# Patient Record
Sex: FEMALE | Race: BLACK OR AFRICAN AMERICAN | NOT HISPANIC OR LATINO | URBAN - METROPOLITAN AREA
[De-identification: names, ages, dates, MRNs, and addresses within clinical notes are randomized per-mention and may not be internally consistent; named-entity substitution may affect disease eponyms.]

---

## 2017-11-24 ENCOUNTER — INPATIENT (INPATIENT)
Facility: HOSPITAL | Age: 72
LOS: 0 days | Discharge: HOME | End: 2017-11-25
Attending: HOSPITALIST | Admitting: HOSPITALIST
Payer: MEDICARE

## 2017-11-24 VITALS
RESPIRATION RATE: 20 BRPM | TEMPERATURE: 100 F | DIASTOLIC BLOOD PRESSURE: 67 MMHG | HEART RATE: 76 BPM | SYSTOLIC BLOOD PRESSURE: 124 MMHG | OXYGEN SATURATION: 95 %

## 2017-11-24 DIAGNOSIS — Z90.710 ACQUIRED ABSENCE OF BOTH CERVIX AND UTERUS: Chronic | ICD-10-CM

## 2017-11-24 DIAGNOSIS — J18.9 PNEUMONIA, UNSPECIFIED ORGANISM: ICD-10-CM

## 2017-11-24 DIAGNOSIS — Z29.9 ENCOUNTER FOR PROPHYLACTIC MEASURES, UNSPECIFIED: ICD-10-CM

## 2017-11-24 DIAGNOSIS — Z90.49 ACQUIRED ABSENCE OF OTHER SPECIFIED PARTS OF DIGESTIVE TRACT: Chronic | ICD-10-CM

## 2017-11-24 DIAGNOSIS — I10 ESSENTIAL (PRIMARY) HYPERTENSION: ICD-10-CM

## 2017-11-24 DIAGNOSIS — N17.9 ACUTE KIDNEY FAILURE, UNSPECIFIED: ICD-10-CM

## 2017-11-24 LAB
ALBUMIN SERPL ELPH-MCNC: 3.4 G/DL — SIGNIFICANT CHANGE UP (ref 3.3–5)
ALP SERPL-CCNC: 78 U/L — SIGNIFICANT CHANGE UP (ref 40–120)
ALT FLD-CCNC: 59 U/L — HIGH (ref 4–33)
APPEARANCE UR: CLEAR — SIGNIFICANT CHANGE UP
AST SERPL-CCNC: 81 U/L — HIGH (ref 4–32)
B PERT DNA SPEC QL NAA+PROBE: SIGNIFICANT CHANGE UP
BACTERIA # UR AUTO: SIGNIFICANT CHANGE UP
BASE EXCESS BLDV CALC-SCNC: 4.9 MMOL/L — SIGNIFICANT CHANGE UP
BASOPHILS # BLD AUTO: 0.02 K/UL — SIGNIFICANT CHANGE UP (ref 0–0.2)
BASOPHILS NFR BLD AUTO: 0.2 % — SIGNIFICANT CHANGE UP (ref 0–2)
BILIRUB SERPL-MCNC: 0.6 MG/DL — SIGNIFICANT CHANGE UP (ref 0.2–1.2)
BILIRUB UR-MCNC: NEGATIVE — SIGNIFICANT CHANGE UP
BLOOD GAS VENOUS - CREATININE: 1.19 MG/DL — SIGNIFICANT CHANGE UP (ref 0.5–1.3)
BLOOD UR QL VISUAL: HIGH
BUN SERPL-MCNC: 15 MG/DL — SIGNIFICANT CHANGE UP (ref 7–23)
C PNEUM DNA SPEC QL NAA+PROBE: NOT DETECTED — SIGNIFICANT CHANGE UP
CALCIUM SERPL-MCNC: 8.8 MG/DL — SIGNIFICANT CHANGE UP (ref 8.4–10.5)
CHLORIDE BLDV-SCNC: 96 MMOL/L — SIGNIFICANT CHANGE UP (ref 96–108)
CHLORIDE SERPL-SCNC: 94 MMOL/L — LOW (ref 98–107)
CO2 SERPL-SCNC: 27 MMOL/L — SIGNIFICANT CHANGE UP (ref 22–31)
COLOR SPEC: YELLOW — SIGNIFICANT CHANGE UP
CREAT SERPL-MCNC: 1.27 MG/DL — SIGNIFICANT CHANGE UP (ref 0.5–1.3)
EOSINOPHIL # BLD AUTO: 0 K/UL — SIGNIFICANT CHANGE UP (ref 0–0.5)
EOSINOPHIL NFR BLD AUTO: 0 % — SIGNIFICANT CHANGE UP (ref 0–6)
FLUAV H1 2009 PAND RNA SPEC QL NAA+PROBE: NOT DETECTED — SIGNIFICANT CHANGE UP
FLUAV H1 RNA SPEC QL NAA+PROBE: NOT DETECTED — SIGNIFICANT CHANGE UP
FLUAV H3 RNA SPEC QL NAA+PROBE: NOT DETECTED — SIGNIFICANT CHANGE UP
FLUAV SUBTYP SPEC NAA+PROBE: SIGNIFICANT CHANGE UP
FLUBV RNA SPEC QL NAA+PROBE: NOT DETECTED — SIGNIFICANT CHANGE UP
GAS PNL BLDV: 133 MMOL/L — LOW (ref 136–146)
GLUCOSE BLDV-MCNC: 108 — HIGH (ref 70–99)
GLUCOSE SERPL-MCNC: 106 MG/DL — HIGH (ref 70–99)
GLUCOSE UR-MCNC: NEGATIVE — SIGNIFICANT CHANGE UP
HADV DNA SPEC QL NAA+PROBE: NOT DETECTED — SIGNIFICANT CHANGE UP
HCO3 BLDV-SCNC: 27 MMOL/L — SIGNIFICANT CHANGE UP (ref 20–27)
HCOV 229E RNA SPEC QL NAA+PROBE: NOT DETECTED — SIGNIFICANT CHANGE UP
HCOV HKU1 RNA SPEC QL NAA+PROBE: NOT DETECTED — SIGNIFICANT CHANGE UP
HCOV NL63 RNA SPEC QL NAA+PROBE: NOT DETECTED — SIGNIFICANT CHANGE UP
HCOV OC43 RNA SPEC QL NAA+PROBE: NOT DETECTED — SIGNIFICANT CHANGE UP
HCT VFR BLD CALC: 40.2 % — SIGNIFICANT CHANGE UP (ref 34.5–45)
HCT VFR BLDV CALC: 39.4 % — SIGNIFICANT CHANGE UP (ref 34.5–45)
HGB BLD-MCNC: 12.6 G/DL — SIGNIFICANT CHANGE UP (ref 11.5–15.5)
HGB BLDV-MCNC: 12.8 G/DL — SIGNIFICANT CHANGE UP (ref 11.5–15.5)
HMPV RNA SPEC QL NAA+PROBE: NOT DETECTED — SIGNIFICANT CHANGE UP
HPIV1 RNA SPEC QL NAA+PROBE: NOT DETECTED — SIGNIFICANT CHANGE UP
HPIV2 RNA SPEC QL NAA+PROBE: NOT DETECTED — SIGNIFICANT CHANGE UP
HPIV3 RNA SPEC QL NAA+PROBE: NOT DETECTED — SIGNIFICANT CHANGE UP
HPIV4 RNA SPEC QL NAA+PROBE: NOT DETECTED — SIGNIFICANT CHANGE UP
IMM GRANULOCYTES # BLD AUTO: 0.05 # — SIGNIFICANT CHANGE UP
IMM GRANULOCYTES NFR BLD AUTO: 0.5 % — SIGNIFICANT CHANGE UP (ref 0–1.5)
KETONES UR-MCNC: SIGNIFICANT CHANGE UP
LACTATE BLDV-MCNC: 1.9 MMOL/L — SIGNIFICANT CHANGE UP (ref 0.5–2)
LEUKOCYTE ESTERASE UR-ACNC: NEGATIVE — SIGNIFICANT CHANGE UP
LYMPHOCYTES # BLD AUTO: 1.48 K/UL — SIGNIFICANT CHANGE UP (ref 1–3.3)
LYMPHOCYTES # BLD AUTO: 15.2 % — SIGNIFICANT CHANGE UP (ref 13–44)
M PNEUMO DNA SPEC QL NAA+PROBE: NOT DETECTED — SIGNIFICANT CHANGE UP
MCHC RBC-ENTMCNC: 30.4 PG — SIGNIFICANT CHANGE UP (ref 27–34)
MCHC RBC-ENTMCNC: 31.3 % — LOW (ref 32–36)
MCV RBC AUTO: 96.9 FL — SIGNIFICANT CHANGE UP (ref 80–100)
MONOCYTES # BLD AUTO: 1.03 K/UL — HIGH (ref 0–0.9)
MONOCYTES NFR BLD AUTO: 10.6 % — SIGNIFICANT CHANGE UP (ref 2–14)
NEUTROPHILS # BLD AUTO: 7.15 K/UL — SIGNIFICANT CHANGE UP (ref 1.8–7.4)
NEUTROPHILS NFR BLD AUTO: 73.5 % — SIGNIFICANT CHANGE UP (ref 43–77)
NITRITE UR-MCNC: NEGATIVE — SIGNIFICANT CHANGE UP
NON-SQ EPI CELLS # UR AUTO: <1 — SIGNIFICANT CHANGE UP
NRBC # FLD: 0 — SIGNIFICANT CHANGE UP
PCO2 BLDV: 50 MMHG — SIGNIFICANT CHANGE UP (ref 41–51)
PH BLDV: 7.39 PH — SIGNIFICANT CHANGE UP (ref 7.32–7.43)
PH UR: 6 — SIGNIFICANT CHANGE UP (ref 4.6–8)
PLATELET # BLD AUTO: 164 K/UL — SIGNIFICANT CHANGE UP (ref 150–400)
PMV BLD: 13.2 FL — HIGH (ref 7–13)
PO2 BLDV: 28 MMHG — LOW (ref 35–40)
POTASSIUM BLDV-SCNC: 4.1 MMOL/L — SIGNIFICANT CHANGE UP (ref 3.4–4.5)
POTASSIUM SERPL-MCNC: 4.4 MMOL/L — SIGNIFICANT CHANGE UP (ref 3.5–5.3)
POTASSIUM SERPL-SCNC: 4.4 MMOL/L — SIGNIFICANT CHANGE UP (ref 3.5–5.3)
PROT SERPL-MCNC: 7.6 G/DL — SIGNIFICANT CHANGE UP (ref 6–8.3)
PROT UR-MCNC: 30 — HIGH
RBC # BLD: 4.15 M/UL — SIGNIFICANT CHANGE UP (ref 3.8–5.2)
RBC # FLD: 13.3 % — SIGNIFICANT CHANGE UP (ref 10.3–14.5)
RBC CASTS # UR COMP ASSIST: SIGNIFICANT CHANGE UP (ref 0–?)
RSV RNA SPEC QL NAA+PROBE: NOT DETECTED — SIGNIFICANT CHANGE UP
RV+EV RNA SPEC QL NAA+PROBE: NOT DETECTED — SIGNIFICANT CHANGE UP
SAO2 % BLDV: 48.4 % — LOW (ref 60–85)
SODIUM SERPL-SCNC: 135 MMOL/L — SIGNIFICANT CHANGE UP (ref 135–145)
SP GR SPEC: 1.01 — SIGNIFICANT CHANGE UP (ref 1–1.03)
SQUAMOUS # UR AUTO: SIGNIFICANT CHANGE UP
UROBILINOGEN FLD QL: NORMAL E.U. — SIGNIFICANT CHANGE UP (ref 0.1–0.2)
WBC # BLD: 9.73 K/UL — SIGNIFICANT CHANGE UP (ref 3.8–10.5)
WBC # FLD AUTO: 9.73 K/UL — SIGNIFICANT CHANGE UP (ref 3.8–10.5)
WBC UR QL: HIGH (ref 0–?)

## 2017-11-24 PROCEDURE — 71020: CPT | Mod: 26

## 2017-11-24 PROCEDURE — 99223 1ST HOSP IP/OBS HIGH 75: CPT | Mod: GC

## 2017-11-24 RX ORDER — ACETAMINOPHEN 500 MG
650 TABLET ORAL EVERY 6 HOURS
Qty: 0 | Refills: 0 | Status: DISCONTINUED | OUTPATIENT
Start: 2017-11-24 | End: 2017-11-25

## 2017-11-24 RX ORDER — SODIUM CHLORIDE 9 MG/ML
500 INJECTION INTRAMUSCULAR; INTRAVENOUS; SUBCUTANEOUS ONCE
Qty: 0 | Refills: 0 | Status: DISCONTINUED | OUTPATIENT
Start: 2017-11-24 | End: 2017-11-24

## 2017-11-24 RX ORDER — HEPARIN SODIUM 5000 [USP'U]/ML
5000 INJECTION INTRAVENOUS; SUBCUTANEOUS EVERY 8 HOURS
Qty: 0 | Refills: 0 | Status: DISCONTINUED | OUTPATIENT
Start: 2017-11-24 | End: 2017-11-25

## 2017-11-24 RX ORDER — ACETAMINOPHEN 500 MG
650 TABLET ORAL ONCE
Qty: 0 | Refills: 0 | Status: COMPLETED | OUTPATIENT
Start: 2017-11-24 | End: 2017-11-24

## 2017-11-24 RX ORDER — SODIUM CHLORIDE 9 MG/ML
1000 INJECTION INTRAMUSCULAR; INTRAVENOUS; SUBCUTANEOUS ONCE
Qty: 0 | Refills: 0 | Status: COMPLETED | OUTPATIENT
Start: 2017-11-24 | End: 2017-11-24

## 2017-11-24 RX ORDER — IPRATROPIUM/ALBUTEROL SULFATE 18-103MCG
3 AEROSOL WITH ADAPTER (GRAM) INHALATION ONCE
Qty: 0 | Refills: 0 | Status: COMPLETED | OUTPATIENT
Start: 2017-11-24 | End: 2017-11-24

## 2017-11-24 RX ORDER — HYDROCHLOROTHIAZIDE 25 MG
25 TABLET ORAL DAILY
Qty: 0 | Refills: 0 | Status: DISCONTINUED | OUTPATIENT
Start: 2017-11-24 | End: 2017-11-25

## 2017-11-24 RX ORDER — HYDROCHLOROTHIAZIDE 25 MG
25 TABLET ORAL DAILY
Qty: 0 | Refills: 0 | Status: DISCONTINUED | OUTPATIENT
Start: 2017-11-24 | End: 2017-11-24

## 2017-11-24 RX ORDER — ACETAMINOPHEN 500 MG
1000 TABLET ORAL ONCE
Qty: 0 | Refills: 0 | Status: COMPLETED | OUTPATIENT
Start: 2017-11-24 | End: 2017-11-24

## 2017-11-24 RX ORDER — AZITHROMYCIN 500 MG/1
500 TABLET, FILM COATED ORAL ONCE
Qty: 0 | Refills: 0 | Status: COMPLETED | OUTPATIENT
Start: 2017-11-24 | End: 2017-11-24

## 2017-11-24 RX ORDER — SODIUM CHLORIDE 9 MG/ML
1000 INJECTION INTRAMUSCULAR; INTRAVENOUS; SUBCUTANEOUS
Qty: 0 | Refills: 0 | Status: DISCONTINUED | OUTPATIENT
Start: 2017-11-24 | End: 2017-11-25

## 2017-11-24 RX ORDER — IPRATROPIUM/ALBUTEROL SULFATE 18-103MCG
3 AEROSOL WITH ADAPTER (GRAM) INHALATION EVERY 6 HOURS
Qty: 0 | Refills: 0 | Status: DISCONTINUED | OUTPATIENT
Start: 2017-11-24 | End: 2017-11-25

## 2017-11-24 RX ORDER — CEFTRIAXONE 500 MG/1
1 INJECTION, POWDER, FOR SOLUTION INTRAMUSCULAR; INTRAVENOUS ONCE
Qty: 0 | Refills: 0 | Status: COMPLETED | OUTPATIENT
Start: 2017-11-24 | End: 2017-11-24

## 2017-11-24 RX ADMIN — HEPARIN SODIUM 5000 UNIT(S): 5000 INJECTION INTRAVENOUS; SUBCUTANEOUS at 13:43

## 2017-11-24 RX ADMIN — SODIUM CHLORIDE 80 MILLILITER(S): 9 INJECTION INTRAMUSCULAR; INTRAVENOUS; SUBCUTANEOUS at 13:43

## 2017-11-24 RX ADMIN — Medication 3 MILLILITER(S): at 10:24

## 2017-11-24 RX ADMIN — CEFTRIAXONE 100 GRAM(S): 500 INJECTION, POWDER, FOR SOLUTION INTRAMUSCULAR; INTRAVENOUS at 10:18

## 2017-11-24 RX ADMIN — Medication 650 MILLIGRAM(S): at 23:03

## 2017-11-24 RX ADMIN — AZITHROMYCIN 250 MILLIGRAM(S): 500 TABLET, FILM COATED ORAL at 11:04

## 2017-11-24 RX ADMIN — Medication 3 MILLILITER(S): at 09:53

## 2017-11-24 RX ADMIN — Medication 3 MILLILITER(S): at 22:55

## 2017-11-24 RX ADMIN — Medication 100 MILLIGRAM(S): at 23:02

## 2017-11-24 RX ADMIN — SODIUM CHLORIDE 1000 MILLILITER(S): 9 INJECTION INTRAMUSCULAR; INTRAVENOUS; SUBCUTANEOUS at 10:14

## 2017-11-24 RX ADMIN — Medication 400 MILLIGRAM(S): at 10:08

## 2017-11-24 NOTE — H&P ADULT - NSHPLABSRESULTS_GEN_ALL_CORE
(11-24 @ 10:12)                      12.6  9.73 )-----------( 164                 40.2    Neutrophils = 7.15 (73.5%)  Lymphocytes = 1.48 (15.2%)  Eosinophils = 0.00 (0.0%)  Basophils = 0.02 (0.2%)  Monocytes = 1.03 (10.6%)  Bands = --%    11-24    135  |  94<L>  |  15  ----------------------------<  106<H>  4.4   |  27  |  1.27    Ca    8.8      24 Nov 2017 10:12    TPro  7.6  /  Alb  3.4  /  TBili  0.6  /  DBili  x   /  AST  81<H>  /  ALT  59<H>  /  AlkPhos  78  11-24          RVP: negative     Venous Blood Gas:  11-24 @ 10:12  7.39/50/28/27/48.4  VBG Lactate: 1.9      Chest X-ray: RLL consolidation.

## 2017-11-24 NOTE — ED PROVIDER NOTE - MEDICAL DECISION MAKING DETAILS
Pt is a 73 y/o F nonsmoker PMHx HTN, h/o hysterectomy p/w chills, cough x 1 week. -- concerning for pneumonia, bronchitis, sepsis -- labs, ua, ucx, cxr, vbg, iv fluids, antibiotics

## 2017-11-24 NOTE — ED PROVIDER NOTE - NONTENDER LOCATION
left upper quadrant/left lower quadrant/umbilical/left costovertebral angle/right lower quadrant/right costovertebral angle/right upper quadrant/periumbilical/suprapubic

## 2017-11-24 NOTE — H&P ADULT - PROBLEM SELECTOR PLAN 1
-CXR notable for RLL PNA; s/p 1 dose of CTX & azithromycin   -will continue empiric coverage Levaquin 750 mg x 4 days  - f/u sputum cultures and urine legionella  - albuterol PRN for wheezing.   - tyelnol PRN for fevers  - Does not meet sepsis criteria but will f/u blood Cx. -CXR notable for RLL PNA; s/p 1 dose of CTX & azithromycin   -will continue empiric coverage Levaquin 750 mg x 4 days  - RVP negative; f/u sputum cultures and urine legionella  - albuterol PRN for wheezing.   - tyelnol PRN for fevers  - Does not meet sepsis criteria but will f/u blood Cx. -CXR notable for RML PNA; s/p 1 dose of CTX & azithromycin   -will continue empiric coverage Levaquin 750 mg x 4 days  - RVP negative; f/u sputum cultures and urine legionella  - albuterol PRN for wheezing.   - tyelnol PRN for fevers  - Does not meet sepsis criteria but will f/u blood Cx.

## 2017-11-24 NOTE — H&P ADULT - ATTENDING COMMENTS
Patient seen and examined, chart/lab reviewed, agree with above.    73 y/o female with h/o HTN, p/w cough/dyspnea, found to have RLL/RML pneumonia. Recent travel to [place] 2 wks ago, denies sick contact, diarrhea, chest pain.  PE: lung right base crackle, heart regular, abdomen soft, nt/nd, extrem: no edema    Assessment/plan:  # Community acquired pneumonia: iv levaquin, duoneb prn for wheezes, RVP negative, if improving can be discharged soon  check urine legionella and sputum cx   # HTN: controlled, cont hctz Patient seen and examined, chart/lab reviewed, agree with above.    73 y/o female with h/o HTN, p/w cough/dyspnea, found to have RLL/RML pneumonia. Recent travel to [place] 2 wks ago, denies sick contact, diarrhea, chest pain.  PE: lung right base crackle, heart regular, abdomen soft, nt/nd, extrem: no edema    Assessment/plan:  # Community acquired pneumonia: iv levaquin, duoneb prn for wheezes, RVP negative, if improving can be discharged soon  check urine legionella and sputum cx   # Mild transaminitis (AST/ALT 81/59), hyponatremia (Na 133 on VBG, 135 on BMP): IVF NS, r/o legionella which can cause hyponatremia/transaminitis  # HTN: controlled, cont hctz Patient seen and examined, chart/lab reviewed, agree with above.    71 y/o female with h/o HTN, p/w cough/dyspnea, found to have RML pneumonia. Recent travel to [place] 2 wks ago, denies sick contact, diarrhea, chest pain.  PE: lung right mid lobe crackle, heart regular, abdomen soft, nt/nd, extrem: no edema    Assessment/plan:  # Community acquired pneumonia: RML infiltrate on CXR,  iv levaquin, duoneb prn for wheezes, RVP negative, if improving can be discharged soon  check urine legionella and sputum cx   # Mild transaminitis (AST/ALT 81/59), hyponatremia (Na 133 on VBG, 135 on BMP): IVF NS, r/o legionella which can cause hyponatremia/transaminitis  # HTN: controlled, cont hctz

## 2017-11-24 NOTE — H&P ADULT - NSHPPHYSICALEXAM_GEN_ALL_CORE
Vital Signs Last 24 Hrs  T(C): 36.8 (24 Nov 2017 12:24), Max: 39.7 (24 Nov 2017 09:53)  T(F): 98.3 (24 Nov 2017 12:24), Max: 103.4 (24 Nov 2017 09:53)  HR: 78 (24 Nov 2017 12:24) (76 - 78)  BP: 133/62 (24 Nov 2017 12:24) (124/67 - 133/62)  BP(mean): --  RR: 17 (24 Nov 2017 12:24) (17 - 20)  SpO2: 95% (24 Nov 2017 12:24) (95% - 95%)    GENERAL: elderly female in no acute respiratory distress.   HEAD:  Atraumatic, Normocephalic  EYES: EOMI, PERRLA.   NECK: Supple, No JVD  CHEST/LUNG: diminished breath sounds (+)rales right base   HEART: Regular rate and rhythm; No murmurs, rubs, or gallops  ABDOMEN: Soft, Nontender, Nondistended; Bowel sounds present  EXTREMITIES:  2+ Peripheral Pulses, trace pedal edema b/l   PSYCH: AAOx3  NEUROLOGY: non-focal  SKIN: No rashes or lesions Vital Signs Last 24 Hrs  T(C): 36.8 (24 Nov 2017 12:24), Max: 39.7 (24 Nov 2017 09:53)  T(F): 98.3 (24 Nov 2017 12:24), Max: 103.4 (24 Nov 2017 09:53)  HR: 78 (24 Nov 2017 12:24) (76 - 78)  BP: 133/62 (24 Nov 2017 12:24) (124/67 - 133/62)  BP(mean): --  RR: 17 (24 Nov 2017 12:24) (17 - 20)  SpO2: 95% (24 Nov 2017 12:24) (95% - 95%)    GENERAL: elderly female in no acute respiratory distress.   HEAD:  Atraumatic, Normocephalic  EYES: EOMI, PERRLA.   NECK: Supple, No JVD  CHEST/LUNG: diminished breath sounds bibasilar;  (+)rales right base   HEART: Regular rate and rhythm; No murmurs, rubs, or gallops  ABDOMEN: Soft, Nontender, Nondistended; Bowel sounds present  EXTREMITIES:  2+ Peripheral Pulses, trace pedal edema b/l   PSYCH: AAOx3  NEUROLOGY: non-focal  SKIN: No rashes or lesions

## 2017-11-24 NOTE — ED PROVIDER NOTE - OBJECTIVE STATEMENT
Pt is a 73 y/o F nonsmoker PMHx HTN, h/o hysterectomy p/w chills, cough x 1 week.  Pt states she has had gradual onset chills associated with persistent nonproductive cough.  Pt notes for past few days she has developed wheezing and shortness of breath.  Pt notes nausea.  Denies any vomiting, sore throat, difficulty swallowing, chest pain, palpitations, headache, neck pain, abdominal pain, dysuria, cloudy urine, rash, sick contacts, recent hospitalizations.

## 2017-11-24 NOTE — ED ADULT NURSE NOTE - OBJECTIVE STATEMENT
is a 73 y/o female with c/o cough, SOB and fever. pt is AAOx4 ambulatory but has been feeling weak since she has had the cough and SOB. pt states sh has no appetite and it has been difficult for her to eat.

## 2017-11-24 NOTE — H&P ADULT - PROBLEM SELECTOR PLAN 3
-Patient reports decreased PO intake; unknown baseline Cr  -Light IVF hydration; trend Cr, avoid nephrotoxic agents.

## 2017-11-24 NOTE — ED PROVIDER NOTE - ATTENDING CONTRIBUTION TO CARE
I agree with the above H&P.  Briefly this is a 72 year old female with pmh htn presenting with cough fever and loss of appetitie for 1 week.  recently returned from [place].  concern for cap.  will get cxr fluids abx for cap and atypicals lactate.  likely admit.  less likely ape, acs, gerd.      CON : NAD  EENT : EOMI, MMM  NECK : Full ROM  RESP : crackles on right fields no increased WOB  CARD : rrr no m/r/g  ABD : S NT ND NABS no rebound  EXT : No edema  NEURO : AAOX3

## 2017-11-24 NOTE — H&P ADULT - HISTORY OF PRESENT ILLNESS
71 y/o F hx of HTN presents with shortness of breath, fever and non productive cough. Patient reports that cough started 9 days ago shortly after returning from [place]. Over the last week she has felt warm and developed shortness of breath and wheezing today that prompted her to seek medical care. She has no known sick contacts, recent hospitalizations or antibiotic use. She denies any hx of asthma or use of inhalers at home, denies ever suffering from pneumonia in past. She denies any HA, rhinorrhea, sore throat, chest pain, dysuria, diarrhea, or hx of DVT/PE.    In ED, patient given patient febrile to 103.4, saturating 95% w/RR of 20. She was given Duonebs, a dose of azithromycin and ceftriaxone with improvement. 73 y/o F hx of HTN presents with shortness of breath, fever and non productive cough. Patient reports that cough started 9 days ago shortly after returning from [place]. Over the last week she has felt warm and developed shortness of breath and wheezing today that prompted her to seek medical care. She has no known sick contacts, recent hospitalizations or antibiotic use. She denies any hx of asthma or use of inhalers at home, denies ever suffering from pneumonia in past. Describes generalized malaise and loss of appetite, but denies any HA, rhinorrhea, sore throat, chest pain, dysuria, diarrhea, or hx of DVT/PE.    In ED, patient given patient febrile to 103.4, saturating 95% w/RR of 20. She was given Duonebs, a dose of azithromycin and ceftriaxone with improvement. ·	71 y/o F hx of HTN presents with shortness of breath, fever and non productive cough. Patient reports that cough started 9 days ago shortly after returning from [place]. Over the last week she has felt warm and developed shortness of breath and wheezing today that prompted her to seek medical care. She has no known sick contacts, recent hospitalizations or antibiotic use. She denies any hx of asthma or use of inhalers at home, denies ever suffering from pneumonia in past. Describes generalized malaise and loss of appetite, but denies any HA, rhinorrhea, sore throat, chest pain, dysuria, diarrhea, or hx of DVT/PE.    In ED, patient given patient febrile to 103.4, saturating 95% w/RR of 20. She was given Duonebs, a dose of azithromycin and ceftriaxone with improvement.

## 2017-11-24 NOTE — H&P ADULT - NSHPSOCIALHISTORY_GEN_ALL_CORE
Patient lives at home with . Recent travel to [place] two weeks ago. Denies any tobacco use past or present; social EtOH consumption.

## 2017-11-24 NOTE — H&P ADULT - ASSESSMENT
71 y/o F hx of HTN admitted for community acquired pneumonia PSI 2. Little concern for MDR organisms given no recent hospitalizations or abx use; however, given recent travel will cover for atypical organisms. Does not meet criteria for sepsis.

## 2017-11-24 NOTE — H&P ADULT - NSHPREVIEWOFSYSTEMS_GEN_ALL_CORE
REVIEW OF SYSTEMS    General:	(+)fever, (+)LOLIS, (+)weakness, no night sweats.     Skin/Breast: no rash   	  Ophthalmologic: no vision changes   	  ENMT: no sore throat, no rhinorrhea    Respiratory and Thorax: (+)cough, (+)wheezing (+)dyspnea   	  Cardiovascular: no chest pain, no palpitations, no syncope, no orthopnea 	    Gastrointestinal: no nausea, no vomiting, no abdominal pain, no diarrhea,     Genitourinary: no dysuria, no frequency	    Musculoskeletal: no myalgias     Neurological: no dizziness, no HA		    Hematology/Lymphatics: no edema     Endocrine: no polyuria REVIEW OF SYSTEMS    General:	(+)fever, (+)LOLIS, (+)malaise, no night sweats.     Skin/Breast: no rash   	  Ophthalmologic: no vision changes   	  ENMT: no sore throat, no rhinorrhea    Respiratory and Thorax: (+)cough, (+)wheezing (+)dyspnea   	  Cardiovascular: no chest pain, no palpitations, no syncope, no orthopnea 	    Gastrointestinal: no nausea, no vomiting, no abdominal pain, no diarrhea,     Genitourinary: no dysuria, no frequency	    Musculoskeletal: no myalgias     Neurological: no dizziness, no HA		    Hematology/Lymphatics: no edema     Endocrine: no polyuria REVIEW OF SYSTEMS    General:	(+)fever, (+)LOLIS, (+)malaise, no night sweats.   Skin/Breast: no rash   Ophthalmologic: no vision changes   ENMT: no sore throat, no rhinorrhea  Respiratory and Thorax: (+)cough, (+)wheezing (+)dyspnea   Cardiovascular: no chest pain, no palpitations, no syncope, no orthopnea 	  Gastrointestinal: no nausea, no vomiting, no abdominal pain, no diarrhea,   Genitourinary: no dysuria, no frequency	  Musculoskeletal: no myalgias   Neurological: no dizziness, no HA		  Hematology/Lymphatics: no edema   Endocrine: no polyuria

## 2017-11-24 NOTE — H&P ADULT - PROBLEM SELECTOR PLAN 2
-at home on HCTZ-triamterene; unknown baseline kidney function  -continue HCTZ 25 mg qd with hold parameters; hold triamterene

## 2017-11-25 ENCOUNTER — TRANSCRIPTION ENCOUNTER (OUTPATIENT)
Age: 72
End: 2017-11-25

## 2017-11-25 VITALS
DIASTOLIC BLOOD PRESSURE: 73 MMHG | HEART RATE: 60 BPM | SYSTOLIC BLOOD PRESSURE: 146 MMHG | RESPIRATION RATE: 18 BRPM | TEMPERATURE: 99 F | OXYGEN SATURATION: 96 %

## 2017-11-25 LAB
ALBUMIN SERPL ELPH-MCNC: 2.7 G/DL — LOW (ref 3.3–5)
ALP SERPL-CCNC: 65 U/L — SIGNIFICANT CHANGE UP (ref 40–120)
ALT FLD-CCNC: 49 U/L — HIGH (ref 4–33)
AST SERPL-CCNC: 61 U/L — HIGH (ref 4–32)
BASOPHILS # BLD AUTO: 0.02 K/UL — SIGNIFICANT CHANGE UP (ref 0–0.2)
BASOPHILS NFR BLD AUTO: 0.3 % — SIGNIFICANT CHANGE UP (ref 0–2)
BILIRUB SERPL-MCNC: 0.3 MG/DL — SIGNIFICANT CHANGE UP (ref 0.2–1.2)
BUN SERPL-MCNC: 13 MG/DL — SIGNIFICANT CHANGE UP (ref 7–23)
CALCIUM SERPL-MCNC: 8.4 MG/DL — SIGNIFICANT CHANGE UP (ref 8.4–10.5)
CHLORIDE SERPL-SCNC: 105 MMOL/L — SIGNIFICANT CHANGE UP (ref 98–107)
CO2 SERPL-SCNC: 24 MMOL/L — SIGNIFICANT CHANGE UP (ref 22–31)
CREAT SERPL-MCNC: 0.9 MG/DL — SIGNIFICANT CHANGE UP (ref 0.5–1.3)
EOSINOPHIL # BLD AUTO: 0.01 K/UL — SIGNIFICANT CHANGE UP (ref 0–0.5)
EOSINOPHIL NFR BLD AUTO: 0.2 % — SIGNIFICANT CHANGE UP (ref 0–6)
GLUCOSE SERPL-MCNC: 88 MG/DL — SIGNIFICANT CHANGE UP (ref 70–99)
HCT VFR BLD CALC: 37.1 % — SIGNIFICANT CHANGE UP (ref 34.5–45)
HGB BLD-MCNC: 11.6 G/DL — SIGNIFICANT CHANGE UP (ref 11.5–15.5)
IMM GRANULOCYTES # BLD AUTO: 0.04 # — SIGNIFICANT CHANGE UP
IMM GRANULOCYTES NFR BLD AUTO: 0.7 % — SIGNIFICANT CHANGE UP (ref 0–1.5)
L PNEUMO AG UR QL: NEGATIVE — SIGNIFICANT CHANGE UP
LYMPHOCYTES # BLD AUTO: 1.54 K/UL — SIGNIFICANT CHANGE UP (ref 1–3.3)
LYMPHOCYTES # BLD AUTO: 25.1 % — SIGNIFICANT CHANGE UP (ref 13–44)
MAGNESIUM SERPL-MCNC: 2.4 MG/DL — SIGNIFICANT CHANGE UP (ref 1.6–2.6)
MCHC RBC-ENTMCNC: 30.4 PG — SIGNIFICANT CHANGE UP (ref 27–34)
MCHC RBC-ENTMCNC: 31.3 % — LOW (ref 32–36)
MCV RBC AUTO: 97.4 FL — SIGNIFICANT CHANGE UP (ref 80–100)
MONOCYTES # BLD AUTO: 0.93 K/UL — HIGH (ref 0–0.9)
MONOCYTES NFR BLD AUTO: 15.1 % — HIGH (ref 2–14)
NEUTROPHILS # BLD AUTO: 3.6 K/UL — SIGNIFICANT CHANGE UP (ref 1.8–7.4)
NEUTROPHILS NFR BLD AUTO: 58.6 % — SIGNIFICANT CHANGE UP (ref 43–77)
NRBC # FLD: 0 — SIGNIFICANT CHANGE UP
PHOSPHATE SERPL-MCNC: 2.9 MG/DL — SIGNIFICANT CHANGE UP (ref 2.5–4.5)
PLATELET # BLD AUTO: 171 K/UL — SIGNIFICANT CHANGE UP (ref 150–400)
PMV BLD: 12.6 FL — SIGNIFICANT CHANGE UP (ref 7–13)
POTASSIUM SERPL-MCNC: 4.2 MMOL/L — SIGNIFICANT CHANGE UP (ref 3.5–5.3)
POTASSIUM SERPL-SCNC: 4.2 MMOL/L — SIGNIFICANT CHANGE UP (ref 3.5–5.3)
PROT SERPL-MCNC: 6.4 G/DL — SIGNIFICANT CHANGE UP (ref 6–8.3)
RBC # BLD: 3.81 M/UL — SIGNIFICANT CHANGE UP (ref 3.8–5.2)
RBC # FLD: 13.4 % — SIGNIFICANT CHANGE UP (ref 10.3–14.5)
SODIUM SERPL-SCNC: 141 MMOL/L — SIGNIFICANT CHANGE UP (ref 135–145)
SPECIMEN SOURCE: SIGNIFICANT CHANGE UP
SPECIMEN SOURCE: SIGNIFICANT CHANGE UP
WBC # BLD: 6.14 K/UL — SIGNIFICANT CHANGE UP (ref 3.8–10.5)
WBC # FLD AUTO: 6.14 K/UL — SIGNIFICANT CHANGE UP (ref 3.8–10.5)

## 2017-11-25 PROCEDURE — 99239 HOSP IP/OBS DSCHRG MGMT >30: CPT

## 2017-11-25 RX ADMIN — Medication 25 MILLIGRAM(S): at 05:39

## 2017-11-25 RX ADMIN — HEPARIN SODIUM 5000 UNIT(S): 5000 INJECTION INTRAVENOUS; SUBCUTANEOUS at 05:33

## 2017-11-25 RX ADMIN — Medication 100 MILLIGRAM(S): at 09:50

## 2017-11-25 RX ADMIN — Medication 650 MILLIGRAM(S): at 00:03

## 2017-11-25 NOTE — DISCHARGE NOTE ADULT - PLAN OF CARE
Finish antibiotic treatment You were treated for pneumonia during your hospital stay. Finish your course of Levofloxacin for 5 more days and follow up with your primary care doctor in 1-2 weeks. BP <150/90 Continue your blood pressure medication at home. Avoid high salt foods, exercise as tolerated and get your pressures checked regularly with your primary care doctor.

## 2017-11-25 NOTE — PROGRESS NOTE ADULT - SUBJECTIVE AND OBJECTIVE BOX
Patient is a 72y old  Female who presents with a chief complaint of Coughing x1 week, poor appetite and weakness. (2017 14:29)      SUBJECTIVE / OVERNIGHT EVENTS:    MEDICATIONS  (STANDING):  heparin  Injectable 5000 Unit(s) SubCutaneous every 8 hours  hydrochlorothiazide 25 milliGRAM(s) Oral daily  levoFLOXacin IVPB 750 milliGRAM(s) IV Intermittent every 24 hours  sodium chloride 0.9%. 1000 milliLiter(s) (80 mL/Hr) IV Continuous <Continuous>    MEDICATIONS  (PRN):  acetaminophen   Tablet 650 milliGRAM(s) Oral every 6 hours PRN For Temp greater than 38 C (100.4 F)  ALBUTerol/ipratropium for Nebulization 3 milliLiter(s) Nebulizer every 6 hours PRN Shortness of Breath and/or Wheezing  benzonatate 100 milliGRAM(s) Oral every 8 hours PRN Cough        CAPILLARY BLOOD GLUCOSE        I&O's Summary    2017 07:01  -  2017 06:52  --------------------------------------------------------  IN: 1000 mL / OUT: 0 mL / NET: 1000 mL    Vital Signs Last 24 Hrs  T(C): 36.9 (2017 05:37), Max: 39.7 (2017 09:53)  T(F): 98.5 (2017 05:37), Max: 103.4 (2017 09:53)  HR: 58 (2017 05:37) (58 - 79)  BP: 137/51 (2017 05:37) (122/48 - 137/51)  BP(mean): --  RR: 18 (2017 05:37) (17 - 20)  SpO2: 99% (2017 05:37) (95% - 99%)    PHYSICAL EXAM:  GENERAL: NAD, well-developed  HEAD:  Atraumatic, Normocephalic  EYES: EOMI, PERRLA, conjunctiva and sclera clear  NECK: Supple, No JVD  CHEST/LUNG: Clear to auscultation bilaterally; No wheeze  HEART: Regular rate and rhythm; No murmurs, rubs, or gallops  ABDOMEN: Soft, Nontender, Nondistended; Bowel sounds present  EXTREMITIES:  2+ Peripheral Pulses, No clubbing, cyanosis, or edema  PSYCH: AAOx3  NEUROLOGY: non-focal  SKIN: No rashes or lesions    LABS:  ( @ 10:12)                        12.6  9.73 )-----------( 164                 40.2    Neutrophils = 7.15 (73.5%)  Lymphocytes = 1.48 (15.2%)  Eosinophils = 0.00 (0.0%)  Basophils = 0.02 (0.2%)  Monocytes = 1.03 (10.6%)  Bands = --%    WBC Trend: 9.73<--  Hb Trend: 12.6<--  Plt Trend: 164<--      135  |  94<L>  |  15  ----------------------------<  106<H>  4.4   |  27  |  1.27    Ca    8.8      2017 10:12    TPro  7.6  /  Alb  3.4  /  TBili  0.6  /  DBili  x   /  AST  81<H>  /  ALT  59<H>  /  AlkPhos  78      Creatinine Trend: 1.27<--        Urinalysis Basic - ( 2017 13:35 )    Color: YELLOW / Appearance: CLEAR / S.011 / pH: 6.0  Gluc: NEGATIVE / Ketone: TRACE  / Bili: NEGATIVE / Urobili: NORMAL E.U.   Blood: TRACE / Protein: 30 / Nitrite: NEGATIVE   Leuk Esterase: NEGATIVE / RBC: 0-2 / WBC 5-10   Sq Epi: OCC / Non Sq Epi: x / Bacteria: FEW        Microbiology:  Urine Cx:  Blood Cx:    RADIOLOGY & ADDITIONAL TESTS:  X- Ray:  CT:  Ultrasound:  [ ] imaging personally reviewed and interpreted by me    Consultant(s) Notes Reviewed:      Care Discussed with Consultants/Other Providers: Patient is a 72y old  Female who presents with a chief complaint of Coughing x1 week, poor appetite and weakness. (2017 14:29)      SUBJECTIVE / OVERNIGHT EVENTS: Overnight no acute issues. Patient still has cough that is alleviated with Tessalon pearls, but then returns.     MEDICATIONS  (STANDING):  heparin  Injectable 5000 Unit(s) SubCutaneous every 8 hours  hydrochlorothiazide 25 milliGRAM(s) Oral daily  levoFLOXacin IVPB 750 milliGRAM(s) IV Intermittent every 24 hours  sodium chloride 0.9%. 1000 milliLiter(s) (80 mL/Hr) IV Continuous <Continuous>    MEDICATIONS  (PRN):  acetaminophen   Tablet 650 milliGRAM(s) Oral every 6 hours PRN For Temp greater than 38 C (100.4 F)  ALBUTerol/ipratropium for Nebulization 3 milliLiter(s) Nebulizer every 6 hours PRN Shortness of Breath and/or Wheezing  benzonatate 100 milliGRAM(s) Oral every 8 hours PRN Cough        CAPILLARY BLOOD GLUCOSE        I&O's Summary    2017 07:01  -  2017 06:52  --------------------------------------------------------  IN: 1000 mL / OUT: 0 mL / NET: 1000 mL    Vital Signs Last 24 Hrs  T(C): 36.9 (2017 05:37), Max: 39.7 (2017 09:53)  T(F): 98.5 (2017 05:37), Max: 103.4 (2017 09:53)  HR: 58 (2017 05:37) (58 - 79)  BP: 137/51 (2017 05:37) (122/48 - 137/51)  BP(mean): --  RR: 18 (2017 05:37) (17 - 20)  SpO2: 99% (2017 05:37) (95% - 99%)    PHYSICAL EXAM:  GENERAL: NAD, well-developed  HEAD:  Atraumatic, Normocephalic  EYES: EOMI, PERRLA, conjunctiva and sclera clear  NECK: Supple, No JVD  CHEST/LUNG: (+)Rales R base  HEART: Regular rate and rhythm; No murmurs, rubs, or gallops  ABDOMEN: Soft, Nontender, Nondistended; Bowel sounds present  EXTREMITIES:  2+ Peripheral Pulses, No clubbing, cyanosis, or edema  PSYCH: AAOx3  NEUROLOGY: non-focal  SKIN: No rashes or lesions    LABS:  ( @ 10:12)                        12.6  9.73 )-----------( 164                 40.2    Neutrophils = 7.15 (73.5%)  Lymphocytes = 1.48 (15.2%)  Eosinophils = 0.00 (0.0%)  Basophils = 0.02 (0.2%)  Monocytes = 1.03 (10.6%)  Bands = --%    WBC Trend: 9.73<--  Hb Trend: 12.6<--  Plt Trend: 164<--      135  |  94<L>  |  15  ----------------------------<  106<H>  4.4   |  27  |  1.27    Ca    8.8      2017 10:12    TPro  7.6  /  Alb  3.4  /  TBili  0.6  /  DBili  x   /  AST  81<H>  /  ALT  59<H>  /  AlkPhos  78      Creatinine Trend: 1.27<--        Urinalysis Basic - ( 2017 13:35 )    Color: YELLOW / Appearance: CLEAR / S.011 / pH: 6.0  Gluc: NEGATIVE / Ketone: TRACE  / Bili: NEGATIVE / Urobili: NORMAL E.U.   Blood: TRACE / Protein: 30 / Nitrite: NEGATIVE   Leuk Esterase: NEGATIVE / RBC: 0-2 / WBC 5-10   Sq Epi: OCC / Non Sq Epi: x / Bacteria: FEW        Microbiology:  Urine Legionella Negative.     RADIOLOGY & ADDITIONAL TESTS:  X- Ray: RML PNA Patient is a 72y old  Female who presents with a chief complaint of Coughing x1 week, poor appetite and weakness. (2017 14:29)      SUBJECTIVE / OVERNIGHT EVENTS: Overnight no acute issues. Patient still has cough that is alleviated with Tessalon pearls, but then returns.     MEDICATIONS  (STANDING):  heparin  Injectable 5000 Unit(s) SubCutaneous every 8 hours  hydrochlorothiazide 25 milliGRAM(s) Oral daily  levoFLOXacin IVPB 750 milliGRAM(s) IV Intermittent every 24 hours  sodium chloride 0.9%. 1000 milliLiter(s) (80 mL/Hr) IV Continuous <Continuous>    MEDICATIONS  (PRN):  acetaminophen   Tablet 650 milliGRAM(s) Oral every 6 hours PRN For Temp greater than 38 C (100.4 F)  ALBUTerol/ipratropium for Nebulization 3 milliLiter(s) Nebulizer every 6 hours PRN Shortness of Breath and/or Wheezing  benzonatate 100 milliGRAM(s) Oral every 8 hours PRN Cough        CAPILLARY BLOOD GLUCOSE        I&O's Summary    2017 07:01  -  2017 06:52  --------------------------------------------------------  IN: 1000 mL / OUT: 0 mL / NET: 1000 mL    Vital Signs Last 24 Hrs  T(C): 36.9 (2017 05:37), Max: 39.7 (2017 09:53)  T(F): 98.5 (2017 05:37), Max: 103.4 (2017 09:53)  HR: 58 (2017 05:37) (58 - 79)  BP: 137/51 (2017 05:37) (122/48 - 137/51)  BP(mean): --  RR: 18 (2017 05:37) (17 - 20)  SpO2: 99% (2017 05:37) (95% - 99%)    PHYSICAL EXAM:  GENERAL: NAD, well-developed  HEAD:  Atraumatic, Normocephalic  EYES: EOMI, PERRLA, conjunctiva and sclera clear  NECK: Supple, No JVD  CHEST/LUNG: clear to auscultation bilaterally   HEART: Regular rate and rhythm; No murmurs, rubs, or gallops  ABDOMEN: Soft, Nontender, Nondistended; Bowel sounds present  EXTREMITIES:  2+ Peripheral Pulses, No clubbing, cyanosis, or edema  PSYCH: AAOx3  NEUROLOGY: non-focal  SKIN: No rashes or lesions    LABS:  ( @ 10:12)                        12.6  9.73 )-----------( 164                 40.2    Neutrophils = 7.15 (73.5%)  Lymphocytes = 1.48 (15.2%)  Eosinophils = 0.00 (0.0%)  Basophils = 0.02 (0.2%)  Monocytes = 1.03 (10.6%)  Bands = --%    WBC Trend: 9.73<--  Hb Trend: 12.6<--  Plt Trend: 164<--      135  |  94<L>  |  15  ----------------------------<  106<H>  4.4   |  27  |  1.27    Ca    8.8      2017 10:12    TPro  7.6  /  Alb  3.4  /  TBili  0.6  /  DBili  x   /  AST  81<H>  /  ALT  59<H>  /  AlkPhos  78      Creatinine Trend: 1.27<--        Urinalysis Basic - ( 2017 13:35 )    Color: YELLOW / Appearance: CLEAR / S.011 / pH: 6.0  Gluc: NEGATIVE / Ketone: TRACE  / Bili: NEGATIVE / Urobili: NORMAL E.U.   Blood: TRACE / Protein: 30 / Nitrite: NEGATIVE   Leuk Esterase: NEGATIVE / RBC: 0-2 / WBC 5-10   Sq Epi: OCC / Non Sq Epi: x / Bacteria: FEW        Microbiology:  Urine Legionella Negative.     RADIOLOGY & ADDITIONAL TESTS:  X- Ray: RML PNA

## 2017-11-25 NOTE — DISCHARGE NOTE ADULT - HOSPITAL COURSE
Ms. Pino presented to the emergency department with fever and cough found to have a right middle lobe pneumonia. Her vital signs were stable and she was not hypoxic although she was noted to have some rales and wheezing on exam. She received a dose of ceftriaxone and azithromycin in the ED and was continued on Levofloxacin once admitted. She had no leukocytosis or episodes of fever.    Patient was given Tessalon pearls for cough and Duonebs for wheezing with much improvement. She was discharged on Levofloxacin for 5 more days. Urine legionella and blood cultures resulted negative with no initial lactic acidosis.

## 2017-11-25 NOTE — DISCHARGE NOTE ADULT - CARE PLAN
Principal Discharge DX:	Community acquired pneumonia  Goal:	Finish antibiotic treatment  Instructions for follow-up, activity and diet:	You were treated for pneumonia during your hospital stay. Finish your course of Levofloxacin for 5 more days and follow up with your primary care doctor in 1-2 weeks.  Secondary Diagnosis:	Hypertension  Goal:	BP <150/90  Instructions for follow-up, activity and diet:	Continue your blood pressure medication at home. Avoid high salt foods, exercise as tolerated and get your pressures checked regularly with your primary care doctor.

## 2017-11-25 NOTE — PROGRESS NOTE ADULT - PROBLEM SELECTOR PLAN 3
-Patient reports decreased PO intake; unknown baseline Cr  -Light IVF hydration; trend Cr, avoid nephrotoxic agents. -Patient reports decreased PO intake; unknown baseline Cr  -Resolved; Cr improved s/p IVF hydration.

## 2017-11-25 NOTE — PROGRESS NOTE ADULT - PROBLEM SELECTOR PLAN 1
-CXR notable for RML PNA; s/p 1 dose of CTX & azithromycin   -will continue empiric coverage Levaquin 750 mg x 4 days  - RVP negative; f/u sputum cultures and urine legionella  - albuterol PRN for wheezing.   - tyelnol PRN for fevers  - Does not meet sepsis criteria but will f/u blood Cx. -CXR notable for RML PNA; s/p 1 dose of CTX & azithromycin   -Day #2 abx: will continue empiric coverage Levaquin 750 mg for five total days  -Duonebs q 6; Tessalon Pearls for cough.   - RVP negative; f/u sputum cultures  - urine legionella negative  - tyelnol PRN for fevers  - Does not meet sepsis criteria but will f/u blood Cx. -CXR notable for RML PNA; s/p 1 dose of CTX & azithromycin   -Day #2 abx: will continue empiric coverage Levaquin 750 mg for five total days  -Duonebs q 6 PRN; Tessalon Pearls for cough.   - RVP negative; f/u sputum cultures  - urine legionella negative  - tyelnol PRN for fevers  - Does not meet sepsis criteria but will f/u blood Cx. -CXR notable for RML PNA; s/p 1 dose of CTX & azithromycin   -Day #2 abx: will continue empiric coverage; Levaquin 500 mg for five total days  -Duonebs q 6 PRN; Tessalon Pearls for cough.   - RVP negative; f/u sputum cultures  - urine legionella negative  - tyelnol PRN for fevers  - Does not meet sepsis criteria but will f/u blood Cx.

## 2017-11-25 NOTE — DISCHARGE NOTE ADULT - PATIENT PORTAL LINK FT
“You can access the [employer] Patient Portal, offered by [facility], by registering with the following website: [email or web address]”

## 2017-11-25 NOTE — DISCHARGE NOTE ADULT - MEDICATION SUMMARY - MEDICATIONS TO TAKE
I will START or STAY ON the medications listed below when I get home from the hospital:    hydroCHLOROthiazide-triamterene  --  by mouth   -- Indication: For Hypertension     Tessalon Perles 100 mg oral capsule  -- 1 cap(s) by mouth every 8 hours, As needed, Cough  -- Indication: For Pneumonia    levoFLOXacin 500 mg oral tablet  -- 1 tab(s) by mouth every 24 hours  -- Indication: For Pneumonia

## 2017-11-25 NOTE — PROGRESS NOTE ADULT - ATTENDING COMMENTS
Patient seen and examined, chart/lab reviewed, agree with above.    73 y/o female with h/o HTN, p/w cough/dyspnea, found to have RML pneumonia. Recent travel to [place] 2 wks ago, denies sick contact, diarrhea, chest pain.  PE: lung mainly clear, heart regular, abdomen soft, nt/nd, extrem: no edema    Assessment/plan:  # RML CAP: clinically improving, d/c home today on levaquin 500 mg qd x5 more days to complete 7 day course  Urine legionella Ag negative    # Mild transaminitis: likely fatty liver, outpt f/u PCP  # Hyponatremia: resolved  # HTN: controlled, cont hctz

## 2017-11-26 LAB — SPECIMEN SOURCE: SIGNIFICANT CHANGE UP

## 2017-11-28 LAB — BACTERIA UR CULT: SIGNIFICANT CHANGE UP

## 2017-11-29 LAB
BACTERIA BLD CULT: SIGNIFICANT CHANGE UP
BACTERIA BLD CULT: SIGNIFICANT CHANGE UP

## 2018-09-30 ENCOUNTER — INPATIENT (INPATIENT)
Facility: HOSPITAL | Age: 73
LOS: 2 days | Discharge: HOME | End: 2018-10-03
Attending: INTERNAL MEDICINE | Admitting: INTERNAL MEDICINE
Payer: MEDICARE

## 2018-09-30 VITALS
HEART RATE: 53 BPM | DIASTOLIC BLOOD PRESSURE: 64 MMHG | RESPIRATION RATE: 16 BRPM | TEMPERATURE: 98 F | OXYGEN SATURATION: 96 % | SYSTOLIC BLOOD PRESSURE: 178 MMHG

## 2018-09-30 DIAGNOSIS — Z90.49 ACQUIRED ABSENCE OF OTHER SPECIFIED PARTS OF DIGESTIVE TRACT: Chronic | ICD-10-CM

## 2018-09-30 DIAGNOSIS — Z90.710 ACQUIRED ABSENCE OF BOTH CERVIX AND UTERUS: Chronic | ICD-10-CM

## 2018-09-30 DIAGNOSIS — R07.9 CHEST PAIN, UNSPECIFIED: ICD-10-CM

## 2018-09-30 LAB
ALBUMIN SERPL ELPH-MCNC: 4.2 G/DL — SIGNIFICANT CHANGE UP (ref 3.3–5)
ALP SERPL-CCNC: 77 U/L — SIGNIFICANT CHANGE UP (ref 40–120)
ALT FLD-CCNC: 13 U/L — SIGNIFICANT CHANGE UP (ref 4–33)
AST SERPL-CCNC: 20 U/L — SIGNIFICANT CHANGE UP (ref 4–32)
BASOPHILS # BLD AUTO: 0.02 K/UL — SIGNIFICANT CHANGE UP (ref 0–0.2)
BASOPHILS NFR BLD AUTO: 0.4 % — SIGNIFICANT CHANGE UP (ref 0–2)
BILIRUB SERPL-MCNC: 0.8 MG/DL — SIGNIFICANT CHANGE UP (ref 0.2–1.2)
BUN SERPL-MCNC: 13 MG/DL — SIGNIFICANT CHANGE UP (ref 7–23)
CALCIUM SERPL-MCNC: 9.5 MG/DL — SIGNIFICANT CHANGE UP (ref 8.4–10.5)
CHLORIDE SERPL-SCNC: 103 MMOL/L — SIGNIFICANT CHANGE UP (ref 98–107)
CO2 SERPL-SCNC: 24 MMOL/L — SIGNIFICANT CHANGE UP (ref 22–31)
CREAT SERPL-MCNC: 1.11 MG/DL — SIGNIFICANT CHANGE UP (ref 0.5–1.3)
EOSINOPHIL # BLD AUTO: 0.04 K/UL — SIGNIFICANT CHANGE UP (ref 0–0.5)
EOSINOPHIL NFR BLD AUTO: 0.8 % — SIGNIFICANT CHANGE UP (ref 0–6)
GLUCOSE SERPL-MCNC: 80 MG/DL — SIGNIFICANT CHANGE UP (ref 70–99)
HCT VFR BLD CALC: 42.5 % — SIGNIFICANT CHANGE UP (ref 34.5–45)
HGB BLD-MCNC: 13.7 G/DL — SIGNIFICANT CHANGE UP (ref 11.5–15.5)
IMM GRANULOCYTES # BLD AUTO: 0 # — SIGNIFICANT CHANGE UP
IMM GRANULOCYTES NFR BLD AUTO: 0 % — SIGNIFICANT CHANGE UP (ref 0–1.5)
LYMPHOCYTES # BLD AUTO: 1.98 K/UL — SIGNIFICANT CHANGE UP (ref 1–3.3)
LYMPHOCYTES # BLD AUTO: 38.7 % — SIGNIFICANT CHANGE UP (ref 13–44)
MCHC RBC-ENTMCNC: 31.2 PG — SIGNIFICANT CHANGE UP (ref 27–34)
MCHC RBC-ENTMCNC: 32.2 % — SIGNIFICANT CHANGE UP (ref 32–36)
MCV RBC AUTO: 96.8 FL — SIGNIFICANT CHANGE UP (ref 80–100)
MONOCYTES # BLD AUTO: 0.6 K/UL — SIGNIFICANT CHANGE UP (ref 0–0.9)
MONOCYTES NFR BLD AUTO: 11.7 % — SIGNIFICANT CHANGE UP (ref 2–14)
NEUTROPHILS # BLD AUTO: 2.47 K/UL — SIGNIFICANT CHANGE UP (ref 1.8–7.4)
NEUTROPHILS NFR BLD AUTO: 48.4 % — SIGNIFICANT CHANGE UP (ref 43–77)
NRBC # FLD: 0 — SIGNIFICANT CHANGE UP
PLATELET # BLD AUTO: 193 K/UL — SIGNIFICANT CHANGE UP (ref 150–400)
PMV BLD: 12.1 FL — SIGNIFICANT CHANGE UP (ref 7–13)
POTASSIUM SERPL-MCNC: 4.1 MMOL/L — SIGNIFICANT CHANGE UP (ref 3.5–5.3)
POTASSIUM SERPL-SCNC: 4.1 MMOL/L — SIGNIFICANT CHANGE UP (ref 3.5–5.3)
PROT SERPL-MCNC: 7.2 G/DL — SIGNIFICANT CHANGE UP (ref 6–8.3)
RBC # BLD: 4.39 M/UL — SIGNIFICANT CHANGE UP (ref 3.8–5.2)
RBC # FLD: 12.3 % — SIGNIFICANT CHANGE UP (ref 10.3–14.5)
SODIUM SERPL-SCNC: 140 MMOL/L — SIGNIFICANT CHANGE UP (ref 135–145)
TROPONIN T, HIGH SENSITIVITY: 11 NG/L — SIGNIFICANT CHANGE UP (ref ?–14)
TROPONIN T, HIGH SENSITIVITY: 11 NG/L — SIGNIFICANT CHANGE UP (ref ?–14)
WBC # BLD: 5.11 K/UL — SIGNIFICANT CHANGE UP (ref 3.8–10.5)
WBC # FLD AUTO: 5.11 K/UL — SIGNIFICANT CHANGE UP (ref 3.8–10.5)

## 2018-09-30 PROCEDURE — 71045 X-RAY EXAM CHEST 1 VIEW: CPT | Mod: 26

## 2018-09-30 PROCEDURE — 70450 CT HEAD/BRAIN W/O DYE: CPT | Mod: 26

## 2018-09-30 RX ORDER — FAMOTIDINE 10 MG/ML
20 INJECTION INTRAVENOUS DAILY
Qty: 0 | Refills: 0 | Status: DISCONTINUED | OUTPATIENT
Start: 2018-09-30 | End: 2018-10-03

## 2018-09-30 RX ORDER — ASPIRIN/CALCIUM CARB/MAGNESIUM 324 MG
162 TABLET ORAL DAILY
Qty: 0 | Refills: 0 | Status: DISCONTINUED | OUTPATIENT
Start: 2018-09-30 | End: 2018-10-01

## 2018-09-30 RX ADMIN — Medication 162 MILLIGRAM(S): at 13:28

## 2018-09-30 RX ADMIN — FAMOTIDINE 20 MILLIGRAM(S): 10 INJECTION INTRAVENOUS at 13:28

## 2018-09-30 RX ADMIN — Medication 30 MILLILITER(S): at 13:28

## 2018-09-30 NOTE — ED PROVIDER NOTE - MEDICAL DECISION MAKING DETAILS
73F hx HTN presenting for evaluation of 1.5 days of chest pain, atypical symptoms but given age and localization to substernal region with concomitant diaphoresis will eval for ACS, EKG is abnl but unchanges from previous, dispo to cdu vs admission for eval with stress test.

## 2018-09-30 NOTE — ED PROVIDER NOTE - PHYSICAL EXAMINATION
Gen: NAD, non-toxic, conversational  Eyes: PERRLA, EOMI   HENT: Normocephalic, atraumatic. External ears normal, no rhinorrhea, moist mucous membranes.   CV: RRR, no M/R/G  Resp: CTAB, non-labored, speaking without difficulty on room air  Abd: soft, +mild ttp epigastric region, o/w non-ttp, non rigid, no guarding or rebound tenderness  Back: No CVAT bilaterally, no midline ttp  Skin: dry, wwp   Neuro: AOx3, speech is fluent and appropriate  Psych: Mood concerned, affect euthymic

## 2018-09-30 NOTE — ED PROVIDER NOTE - PROGRESS NOTE DETAILS
Patient in sinus david to the high 30s/40s on the monitor, asymptomatic at this time, blood pressure >200 systolic, discussed requirement for admission to the telemetry unit for echo / stress test, awaiting cxr at this time. Will add on a CT head non-con for eval of poss brain mass. RADHA Whipple PGY2

## 2018-09-30 NOTE — ED PROVIDER NOTE - NS ED ROS FT
General: No fevers / chills  HENT: No ear pain, runny nose, or sore throat  Eyes: No visual changes  CP: +chest pain, no palpitations, no light headedness  Resp: No shortness of breath, no cough  GI: +abdominal pain, no diarrhea, no constipation, no nausea, no vomiting  : No dysuria or hematuria  Neuro: No numbness, tingling, or weakness  Endo: No hx of diabetes  Heme: No hx of easy bleeding or bruising

## 2018-09-30 NOTE — ED ADULT NURSE NOTE - NSIMPLEMENTINTERV_GEN_ALL_ED
Implemented All Fall Risk Interventions:  [place] to call system. Call bell, personal items and telephone within reach. Instruct patient to call for assistance. Room bathroom lighting operational. Non-slip footwear when patient is off stretcher. Physically safe environment: no spills, clutter or unnecessary equipment. Stretcher in lowest position, wheels locked, appropriate side rails in place. Provide visual cue, wrist band, yellow gown, etc. Monitor gait and stability. Monitor for mental status changes and reorient to person, place, and time. Review medications for side effects contributing to fall risk. Reinforce activity limits and safety measures with patient and family.

## 2018-09-30 NOTE — ED PROVIDER NOTE - OBJECTIVE STATEMENT
1.5 days of chest pain, feels substernal, radiates to the back, notes that it causes her to belch and that this alleviates her symptoms somewhat. Has had the pain range from severe to mild. Notes that it has not diminished fully, is worsened with certain foods, does cause her to be sweaty, does not make her short of breath, is uncertain if it worsens with activity or not. Has no history of heart disease, no hx of diabetes, no blood clots in the past, not on estrogen therapy, no recent travel, no recent immobilization. Does feel better when her  massages her upper abdomen / back, but notes that this worsens again afterwards. 74 y/o F w/ Hx HTN.  1.5 days of chest pain, feels substernal, radiates to the back, notes that it causes her to belch and that this alleviates her symptoms somewhat. Has had the pain range from severe to mild. Notes that it has not diminished fully, is worsened with certain foods, does cause her to be sweaty, does not make her short of breath, is uncertain if it worsens with activity or not. Has no history of heart disease, no hx of diabetes, no blood clots in the past, not on estrogen therapy, no recent travel, no recent immobilization. Does feel better when her  massages her upper abdomen / back, but notes that this worsens again afterwards.  No cough, fever, AP, d/c.

## 2018-09-30 NOTE — ED PROVIDER NOTE - ATTENDING CONTRIBUTION TO CARE
I, Wero Cramer MD, personally saw the patient with the resident, and completed the key components of the history and physical exam. I then discussed the management plan with the resident.    pt w/ intermittent CP c/b bradycardia, heart score >3 - will admit for acs w/u.

## 2018-09-30 NOTE — ED ADULT NURSE NOTE - OBJECTIVE STATEMENT
Pt received from day shift RN in intake.  PT c/o 1.5 days of chest pain, feels substernal, radiates to the back.  Pt admitted to Tele chest pain.

## 2018-10-01 DIAGNOSIS — R00.1 BRADYCARDIA, UNSPECIFIED: ICD-10-CM

## 2018-10-01 DIAGNOSIS — I16.0 HYPERTENSIVE URGENCY: ICD-10-CM

## 2018-10-01 DIAGNOSIS — R07.89 OTHER CHEST PAIN: ICD-10-CM

## 2018-10-01 DIAGNOSIS — Z29.9 ENCOUNTER FOR PROPHYLACTIC MEASURES, UNSPECIFIED: ICD-10-CM

## 2018-10-01 LAB
B BURGDOR C6 AB SER-ACNC: NEGATIVE — SIGNIFICANT CHANGE UP
B BURGDOR IGG+IGM SER-ACNC: 0.04 INDEX — SIGNIFICANT CHANGE UP (ref 0.01–0.89)
BUN SERPL-MCNC: 12 MG/DL — SIGNIFICANT CHANGE UP (ref 7–23)
CALCIUM SERPL-MCNC: 9.4 MG/DL — SIGNIFICANT CHANGE UP (ref 8.4–10.5)
CHLORIDE SERPL-SCNC: 102 MMOL/L — SIGNIFICANT CHANGE UP (ref 98–107)
CHOLEST SERPL-MCNC: 312 MG/DL — HIGH (ref 120–199)
CO2 SERPL-SCNC: 24 MMOL/L — SIGNIFICANT CHANGE UP (ref 22–31)
CREAT SERPL-MCNC: 0.95 MG/DL — SIGNIFICANT CHANGE UP (ref 0.5–1.3)
D DIMER BLD IA.RAPID-MCNC: 412 NG/ML — SIGNIFICANT CHANGE UP
GLUCOSE SERPL-MCNC: 78 MG/DL — SIGNIFICANT CHANGE UP (ref 70–99)
HBA1C BLD-MCNC: 5.9 % — HIGH (ref 4–5.6)
HCT VFR BLD CALC: 44.4 % — SIGNIFICANT CHANGE UP (ref 34.5–45)
HDLC SERPL-MCNC: 62 MG/DL — SIGNIFICANT CHANGE UP (ref 45–65)
HGB BLD-MCNC: 14 G/DL — SIGNIFICANT CHANGE UP (ref 11.5–15.5)
LIPID PNL WITH DIRECT LDL SERPL: 238 MG/DL — SIGNIFICANT CHANGE UP
MAGNESIUM SERPL-MCNC: 2.2 MG/DL — SIGNIFICANT CHANGE UP (ref 1.6–2.6)
MCHC RBC-ENTMCNC: 30.6 PG — SIGNIFICANT CHANGE UP (ref 27–34)
MCHC RBC-ENTMCNC: 31.5 % — LOW (ref 32–36)
MCV RBC AUTO: 96.9 FL — SIGNIFICANT CHANGE UP (ref 80–100)
NRBC # FLD: 0 — SIGNIFICANT CHANGE UP
PHOSPHATE SERPL-MCNC: 3.1 MG/DL — SIGNIFICANT CHANGE UP (ref 2.5–4.5)
PLATELET # BLD AUTO: 184 K/UL — SIGNIFICANT CHANGE UP (ref 150–400)
PMV BLD: 12.5 FL — SIGNIFICANT CHANGE UP (ref 7–13)
POTASSIUM SERPL-MCNC: 3.7 MMOL/L — SIGNIFICANT CHANGE UP (ref 3.5–5.3)
POTASSIUM SERPL-SCNC: 3.7 MMOL/L — SIGNIFICANT CHANGE UP (ref 3.5–5.3)
RBC # BLD: 4.58 M/UL — SIGNIFICANT CHANGE UP (ref 3.8–5.2)
RBC # FLD: 12.2 % — SIGNIFICANT CHANGE UP (ref 10.3–14.5)
SODIUM SERPL-SCNC: 142 MMOL/L — SIGNIFICANT CHANGE UP (ref 135–145)
TRIGL SERPL-MCNC: 101 MG/DL — SIGNIFICANT CHANGE UP (ref 10–149)
TSH SERPL-MCNC: 1.1 UIU/ML — SIGNIFICANT CHANGE UP (ref 0.27–4.2)
WBC # BLD: 5.24 K/UL — SIGNIFICANT CHANGE UP (ref 3.8–10.5)
WBC # FLD AUTO: 5.24 K/UL — SIGNIFICANT CHANGE UP (ref 3.8–10.5)

## 2018-10-01 RX ORDER — AMLODIPINE BESYLATE 2.5 MG/1
10 TABLET ORAL DAILY
Qty: 0 | Refills: 0 | Status: DISCONTINUED | OUTPATIENT
Start: 2018-10-01 | End: 2018-10-03

## 2018-10-01 RX ORDER — HEPARIN SODIUM 5000 [USP'U]/ML
5000 INJECTION INTRAVENOUS; SUBCUTANEOUS EVERY 12 HOURS
Qty: 0 | Refills: 0 | Status: DISCONTINUED | OUTPATIENT
Start: 2018-10-01 | End: 2018-10-03

## 2018-10-01 RX ORDER — ASPIRIN/CALCIUM CARB/MAGNESIUM 324 MG
81 TABLET ORAL DAILY
Qty: 0 | Refills: 0 | Status: DISCONTINUED | OUTPATIENT
Start: 2018-10-01 | End: 2018-10-03

## 2018-10-01 RX ORDER — HYDRALAZINE HCL 50 MG
5 TABLET ORAL ONCE
Qty: 0 | Refills: 0 | Status: COMPLETED | OUTPATIENT
Start: 2018-10-01 | End: 2018-10-01

## 2018-10-01 RX ORDER — INFLUENZA VIRUS VACCINE 15; 15; 15; 15 UG/.5ML; UG/.5ML; UG/.5ML; UG/.5ML
0.5 SUSPENSION INTRAMUSCULAR ONCE
Qty: 0 | Refills: 0 | Status: DISCONTINUED | OUTPATIENT
Start: 2018-10-01 | End: 2018-10-03

## 2018-10-01 RX ADMIN — HEPARIN SODIUM 5000 UNIT(S): 5000 INJECTION INTRAVENOUS; SUBCUTANEOUS at 07:47

## 2018-10-01 RX ADMIN — Medication 81 MILLIGRAM(S): at 12:29

## 2018-10-01 RX ADMIN — HEPARIN SODIUM 5000 UNIT(S): 5000 INJECTION INTRAVENOUS; SUBCUTANEOUS at 18:29

## 2018-10-01 RX ADMIN — FAMOTIDINE 20 MILLIGRAM(S): 10 INJECTION INTRAVENOUS at 12:29

## 2018-10-01 RX ADMIN — Medication 5 MILLIGRAM(S): at 02:59

## 2018-10-01 RX ADMIN — Medication 5 MILLIGRAM(S): at 07:18

## 2018-10-01 NOTE — H&P ADULT - PROBLEM SELECTOR PLAN 3
Blood pressure noted to be 211/71 likely 2/2 to non complaint to home blood pressure medications. Will give 1x dose of IV Hydralazine and then re-evaluate  Will start either Amlodipine or Hydralazine 25mg BID due to non-compliance   DASH diet recommended

## 2018-10-01 NOTE — ED ADULT NURSE REASSESSMENT NOTE - NS ED NURSE REASSESS COMMENT FT1
Pt admitted to tele.  awaiting bed.  VS as noted.  pt denies pain /HA/ CP/ dizziness.  Awaiting further orders.  Will continue to monitor.

## 2018-10-01 NOTE — H&P ADULT - HISTORY OF PRESENT ILLNESS
72 y/o F with PMH of HTN(non complaint with medications) presented with the complaint of substernal chest pain. As per the patient she developed the chest pain about 1.5 days ago. The chest pain was intermittent, located in the middle of the chest, characterized as a burning type of pain, aggravated after she would eat, relief when she would rub on her chest, drinking water and when she burps, with radiation of the chest pain to the back, with a severity of 6/10. Patient also endorsed of nausea but no vomiting. Patient stated that her pain ranges from mild to severe with associated SOB and diaphoresis. Patient denied any fevers, chills, V/D/C, abdominal pain, dysuria, melena, hematochezia, sick contact, pleuritic or positional chest pain.     On ED admission EKG revealed Sinus bradycardia with PAC at a rate of 48 with TWI in leads I, AVL, V6 and LVH with QTc of 375, CE x 1: Trop: 11, CE x 2: Trop: 11. CT head: No CT evidence of acute intracranial hemorrhage or extra-axial collection. Prelim CXR: No emergent findings. When examined patient is resting in the stretcher and denied any current chest pain or SOB. In the ED patient received Aspirin 162mg, Maalox and Pepcid.

## 2018-10-01 NOTE — H&P ADULT - RS GEN PE MLT RESP DETAILS PC
no chest wall tenderness/clear to auscultation bilaterally/respirations non-labored/no rhonchi/good air movement/normal/no rales/no intercostal retractions/airway patent/breath sounds equal/no wheezes

## 2018-10-01 NOTE — H&P ADULT - PROBLEM SELECTOR PLAN 1
Will monitor on telemetry, serial EKG and Nadiya prn for episodes of chest pain   HgbA1C, TSH, lipid profile, CBC, CMP, D-dimer in am   Consider ischemic workup with NST this admission   TTE ordered to evaluate LVEF   Started on Aspirin 81mg daily

## 2018-10-01 NOTE — CONSULT NOTE ADULT - SUBJECTIVE AND OBJECTIVE BOX
Reason for Admission: Substernal chest pain	  History of Present Illness: 	  72 y/o F with PMH of HTN(non complaint with medications) presented with the complaint of substernal chest pain. As per the patient she developed the chest pain about 1.5 days ago. The chest pain was intermittent, located in the middle of the chest, characterized as a burning type of pain, aggravated after she would eat, relief when she would rub on her chest, drinking water and when she burps, with radiation of the chest pain to the back, with a severity of 6/10. Patient also endorsed of nausea but no vomiting. Patient stated that her pain ranges from mild to severe with associated SOB and diaphoresis. Patient denied any fevers, chills, V/D/C, abdominal pain, dysuria, melena, hematochezia, sick contact, pleuritic or positional chest pain.     On ED admission EKG revealed Sinus bradycardia with PAC at a rate of 48 with TWI in leads I, AVL, V6 and LVH with QTc of 375, CE x 1: Trop: 11, CE x 2: Trop: 11. CT head: No CT evidence of acute intracranial hemorrhage or extra-axial collection. Prelim CXR: No emergent findings. When examined patient is resting in the stretcher and denied any current chest pain or SOB. In the ED patient received Aspirin 162mg, Maalox and Pepcid.     Review of Systems:  · Negative General Symptoms	no fever; no chills; no sweating; no anorexia; no weight loss; no weight gain; no malaise; no fatigue	  · Negative Skin Symptoms	no rash; no itching; no dryness	  · Negative Ophthalmologic Symptoms	no diplopia; no photophobia; no lacrimation L; no lacrimation R; no blurred vision L; no blurred vision R; no discharge L; no discharge R	  · Negative ENMT Symptoms	no hearing difficulty; no ear pain; no tinnitus; no vertigo; no sinus symptoms; no nasal congestion; no nasal discharge	  · Negative Respiratory and Thorax Symptoms	no wheezing; no cough; no hemoptysis; no pleuritic chest pain	  · Respiratory and Thorax Symptoms	dyspnea	  · Negative Cardiovascular Symptoms	no palpitations; no orthopnea; no paroxysmal nocturnal dyspnea; no peripheral edema	  · Cardiovascular Symptoms	chest pain; dyspnea on exertion	  · Negative Gastrointestinal Symptoms	no vomiting; no diarrhea; no constipation; no abdominal pain; no melena; no hematochezia	  · Gastrointestinal Symptoms	nausea	  · Negative General Genitourinary Symptoms	no hematuria; no renal colic; no flank pain L; no flank pain R; no dysuria	  · Negative Musculoskeletal Symptoms	no arthralgia; no arthritis; no joint swelling; no myalgia; no muscle cramps; no muscle weakness; no stiffness; no neck pain	  · Negative Neurological Symptoms	no transient paralysis; no weakness; no paresthesias; no generalized seizures; no focal seizures; no syncope; no tremors; no vertigo; no loss of sensation; no difficulty walking; no headache; no loss of consciousness; no hemiparesis; no confusion	      Allergies and Intolerances:        Allergies:  	No Known Allergies:     Home Medications:   * Patient Currently Takes Medications as of 25-Nov-2017 11:27 documented in Structured Notes    Patient History:   Past Medical History:  HTN (hypertension).    Past Surgical History:  H/O: hysterectomy    History of appendectomy.    Family History:  No pertinent family history in first degree relatives.    Social History:  Social History (marital status, living situation, occupation, tobacco use, alcohol and drug use, and sexual history): , lives with , retired HHA Never smoked/drank/used any illicit drugs	    Tobacco Screening:  · Core Measure Site	No	  · Has the patient used tobacco in the past 30 days?	No	    Risk Assessment:   Present on Admission:  Deep Venous Thrombosis	no	  Pulmonary Embolus	no	  Urinary Catheter	no	  Central Venous Catheter/PICC Line	no	  Surgical Site Incision	no	  Pressure Ulcer(s)	no	    Heart Failure:  Does this patient have a history of or has been diagnosed with heart failure? unknown.      Physical Exam:  · Constitutional	detailed exam	  · Constitutional Details	well-developed; well-groomed; well-nourished; no distress	  · Eyes	detailed exam	  · Eyes Details	conjunctiva clear	  · Neck	detailed exam	  · Neck Details	normal; supple; no JVD	  · Respiratory	detailed exam	  · Respiratory Details	normal; airway patent; breath sounds equal; good air movement; respirations non-labored; clear to auscultation bilaterally; no chest wall tenderness; no intercostal retractions; no rales; no rhonchi; no wheezes	  · Cardiovascular	detailed exam	  · Cardiovascular Details	no rub  no murmur	  · Cardiovascular Details	bradycardia; positive S1; positive S2	  · Gastrointestinal	detailed exam	  · GI Normal	normal; soft; nontender; no distention; bowel sounds normal; no rebound tenderness; no guarding; no rigidity	  · Extremities	detailed exam	  · Extremities Details	normal; no clubbing; no cyanosis; no pedal edema	  · Vascular	detailed exam	  · Radial Pulse	right normal; left normal; +2 pulses b/l	  · DP Pulse	+1 pulses b/l	  · PT Pulse	+1 pulses b/l	  · Neurological	detailed exam	  · Neurological Details	alert and oriented x 3; responds to verbal commands; sensation intact	  · Musculoskeletal	detailed exam	  · Musculoskeletal Details	no joint swelling; no joint erythema; no joint warmth; no calf tenderness	      Labs and Results:  Labs, Radiology, Cardiology, and Other Results: 13.7   5.11  )-----------( 193      ( 30 Sep 2018 13:26 )             42.5    09-30   140  |  103  |  13  ----------------------------<  80  4.1   |  24  |  1.11   Ca    9.5      30 Sep 2018 13:26   TPro  7.2  /  Alb  4.2  /  TBili  0.8  /  DBili  x   /  AST  20  /  ALT  13  /  AlkPhos  77  09-30   CT head: No CT evidence of acute intracranial hemorrhage or extra-axial collection   Prelim CXR: No emergent findings   EKG: Sinus bradycardia with PAC at a rate of 48 with TWI in leads I, AVL, V6 and LVH with QTc of 375	    Assessment and Plan:   Assessment:  · Assessment		  72 y/o F with PMH of HTN presented with the complaint of substernal chest pain and bradycardia. R/o ACS     Problem/Plan - 1:  ·  Problem: Atypical chest pain.  Plan:  given comorbid conditions ischemic w/u as per cards   Problem/Plan - 2:  ·  Problem: Bradycardia.  Plan: tele, EP eval , avoid av silvestre blockers    Problem/Plan - 3:  ·  Problem: Hypertensive urgency.  Plan: Blood pressure noted to be 211/71 likely 2/2 to non complaint to home blood pressure medications. slowly titrate htn meds for optimal bp control

## 2018-10-01 NOTE — H&P ADULT - NEGATIVE MUSCULOSKELETAL SYMPTOMS
no myalgia/no stiffness/no muscle cramps/no muscle weakness/no joint swelling/no arthralgia/no arthritis/no neck pain

## 2018-10-01 NOTE — H&P ADULT - GASTROINTESTINAL DETAILS
no distention/normal/no rebound tenderness/nontender/bowel sounds normal/no rigidity/no guarding/soft

## 2018-10-01 NOTE — H&P ADULT - PROBLEM SELECTOR PLAN 2
On admission patient noted to be bradycardic to mid 40s.   Will monitor on telemetry  Will check TSH, Lyme titers, Zolls/pacer pads  at bedside   Hold all AVN blockers  TTE ordered  Consider NST this admission

## 2018-10-01 NOTE — H&P ADULT - NEGATIVE ENMT SYMPTOMS
no hearing difficulty/no nasal congestion/no ear pain/no tinnitus/no vertigo/no sinus symptoms/no nasal discharge

## 2018-10-01 NOTE — H&P ADULT - NSHPLABSRESULTS_GEN_ALL_CORE
13.7   5.11  )-----------( 193      ( 30 Sep 2018 13:26 )             42.5     09-30    140  |  103  |  13  ----------------------------<  80  4.1   |  24  |  1.11    Ca    9.5      30 Sep 2018 13:26    TPro  7.2  /  Alb  4.2  /  TBili  0.8  /  DBili  x   /  AST  20  /  ALT  13  /  AlkPhos  77  09-30    CT head: No CT evidence of acute intracranial hemorrhage or extra-axial collection    Prelim CXR: No emergent findings    EKG: Sinus bradycardia with PAC at a rate of 48 with TWI in leads I, AVL, V6 and LVH with QTc of 375

## 2018-10-01 NOTE — H&P ADULT - NEGATIVE OPHTHALMOLOGIC SYMPTOMS
no lacrimation L/no lacrimation R/no blurred vision L/no discharge R/no discharge L/no blurred vision R/no diplopia/no photophobia

## 2018-10-01 NOTE — H&P ADULT - NEGATIVE NEUROLOGICAL SYMPTOMS
no syncope/no tremors/no hemiparesis/no confusion/no transient paralysis/no focal seizures/no difficulty walking/no weakness/no paresthesias/no generalized seizures/no vertigo/no loss of consciousness/no loss of sensation/no headache

## 2018-10-01 NOTE — PATIENT PROFILE ADULT. - FUNCTIONAL SCREEN CURRENT LEVEL: DRESSING, MLM
Heparin gtt held for 1 hr then restart @ 10cc/hr as per nomogram, will continue to monitor
(0) independent

## 2018-10-02 LAB
BUN SERPL-MCNC: 17 MG/DL — SIGNIFICANT CHANGE UP (ref 7–23)
CALCIUM SERPL-MCNC: 9.3 MG/DL — SIGNIFICANT CHANGE UP (ref 8.4–10.5)
CHLORIDE SERPL-SCNC: 101 MMOL/L — SIGNIFICANT CHANGE UP (ref 98–107)
CO2 SERPL-SCNC: 22 MMOL/L — SIGNIFICANT CHANGE UP (ref 22–31)
CREAT SERPL-MCNC: 1.08 MG/DL — SIGNIFICANT CHANGE UP (ref 0.5–1.3)
GLUCOSE SERPL-MCNC: 100 MG/DL — HIGH (ref 70–99)
HCT VFR BLD CALC: 43.6 % — SIGNIFICANT CHANGE UP (ref 34.5–45)
HGB BLD-MCNC: 14.1 G/DL — SIGNIFICANT CHANGE UP (ref 11.5–15.5)
MAGNESIUM SERPL-MCNC: 2.3 MG/DL — SIGNIFICANT CHANGE UP (ref 1.6–2.6)
MCHC RBC-ENTMCNC: 30.5 PG — SIGNIFICANT CHANGE UP (ref 27–34)
MCHC RBC-ENTMCNC: 32.3 % — SIGNIFICANT CHANGE UP (ref 32–36)
MCV RBC AUTO: 94.4 FL — SIGNIFICANT CHANGE UP (ref 80–100)
NRBC # FLD: 0 — SIGNIFICANT CHANGE UP
PLATELET # BLD AUTO: 197 K/UL — SIGNIFICANT CHANGE UP (ref 150–400)
PMV BLD: 12.2 FL — SIGNIFICANT CHANGE UP (ref 7–13)
POTASSIUM SERPL-MCNC: 3.9 MMOL/L — SIGNIFICANT CHANGE UP (ref 3.5–5.3)
POTASSIUM SERPL-SCNC: 3.9 MMOL/L — SIGNIFICANT CHANGE UP (ref 3.5–5.3)
RBC # BLD: 4.62 M/UL — SIGNIFICANT CHANGE UP (ref 3.8–5.2)
RBC # FLD: 12.4 % — SIGNIFICANT CHANGE UP (ref 10.3–14.5)
SODIUM SERPL-SCNC: 138 MMOL/L — SIGNIFICANT CHANGE UP (ref 135–145)
WBC # BLD: 4.53 K/UL — SIGNIFICANT CHANGE UP (ref 3.8–10.5)
WBC # FLD AUTO: 4.53 K/UL — SIGNIFICANT CHANGE UP (ref 3.8–10.5)

## 2018-10-02 PROCEDURE — 93306 TTE W/DOPPLER COMPLETE: CPT | Mod: 26

## 2018-10-02 RX ADMIN — Medication 81 MILLIGRAM(S): at 11:51

## 2018-10-02 RX ADMIN — AMLODIPINE BESYLATE 10 MILLIGRAM(S): 2.5 TABLET ORAL at 05:09

## 2018-10-02 RX ADMIN — FAMOTIDINE 20 MILLIGRAM(S): 10 INJECTION INTRAVENOUS at 11:51

## 2018-10-02 RX ADMIN — HEPARIN SODIUM 5000 UNIT(S): 5000 INJECTION INTRAVENOUS; SUBCUTANEOUS at 05:09

## 2018-10-02 RX ADMIN — HEPARIN SODIUM 5000 UNIT(S): 5000 INJECTION INTRAVENOUS; SUBCUTANEOUS at 17:04

## 2018-10-02 NOTE — CHART NOTE - NSCHARTNOTEFT_GEN_A_CORE
< from: Transthoracic Echocardiogram (10.02.18 @ 15:03) >    Ejection Fraction (Teicholtz): 61 %  1. Mitral annular calcification, otherwise normal mitral valve. Minimal mitral regurgitation.  2. Normal left ventricular internal dimensions and wall thicknesses.  3. Endocardium not well visualized; grossly normal left ventricular systolic function.  4. The right ventricle is not well visualized; grossly normal right ventricular systolic function.    Plan for Nuclear Stress Test in AM as discussed with Dr. Markham.

## 2018-10-03 ENCOUNTER — TRANSCRIPTION ENCOUNTER (OUTPATIENT)
Age: 73
End: 2018-10-03

## 2018-10-03 VITALS
OXYGEN SATURATION: 100 % | TEMPERATURE: 98 F | SYSTOLIC BLOOD PRESSURE: 155 MMHG | HEART RATE: 41 BPM | DIASTOLIC BLOOD PRESSURE: 56 MMHG | RESPIRATION RATE: 18 BRPM

## 2018-10-03 LAB
BUN SERPL-MCNC: 15 MG/DL — SIGNIFICANT CHANGE UP (ref 7–23)
CALCIUM SERPL-MCNC: 9 MG/DL — SIGNIFICANT CHANGE UP (ref 8.4–10.5)
CHLORIDE SERPL-SCNC: 103 MMOL/L — SIGNIFICANT CHANGE UP (ref 98–107)
CO2 SERPL-SCNC: 26 MMOL/L — SIGNIFICANT CHANGE UP (ref 22–31)
CREAT SERPL-MCNC: 1.05 MG/DL — SIGNIFICANT CHANGE UP (ref 0.5–1.3)
GLUCOSE SERPL-MCNC: 80 MG/DL — SIGNIFICANT CHANGE UP (ref 70–99)
HCT VFR BLD CALC: 43.1 % — SIGNIFICANT CHANGE UP (ref 34.5–45)
HGB BLD-MCNC: 13.6 G/DL — SIGNIFICANT CHANGE UP (ref 11.5–15.5)
MCHC RBC-ENTMCNC: 30.7 PG — SIGNIFICANT CHANGE UP (ref 27–34)
MCHC RBC-ENTMCNC: 31.6 % — LOW (ref 32–36)
MCV RBC AUTO: 97.3 FL — SIGNIFICANT CHANGE UP (ref 80–100)
NRBC # FLD: 0 — SIGNIFICANT CHANGE UP
PLATELET # BLD AUTO: 196 K/UL — SIGNIFICANT CHANGE UP (ref 150–400)
PMV BLD: 12.6 FL — SIGNIFICANT CHANGE UP (ref 7–13)
POTASSIUM SERPL-MCNC: 4.5 MMOL/L — SIGNIFICANT CHANGE UP (ref 3.5–5.3)
POTASSIUM SERPL-SCNC: 4.5 MMOL/L — SIGNIFICANT CHANGE UP (ref 3.5–5.3)
RBC # BLD: 4.43 M/UL — SIGNIFICANT CHANGE UP (ref 3.8–5.2)
RBC # FLD: 12.3 % — SIGNIFICANT CHANGE UP (ref 10.3–14.5)
SODIUM SERPL-SCNC: 139 MMOL/L — SIGNIFICANT CHANGE UP (ref 135–145)
WBC # BLD: 4.35 K/UL — SIGNIFICANT CHANGE UP (ref 3.8–10.5)
WBC # FLD AUTO: 4.35 K/UL — SIGNIFICANT CHANGE UP (ref 3.8–10.5)

## 2018-10-03 PROCEDURE — 93018 CV STRESS TEST I&R ONLY: CPT | Mod: GC

## 2018-10-03 PROCEDURE — 78452 HT MUSCLE IMAGE SPECT MULT: CPT | Mod: 26

## 2018-10-03 PROCEDURE — 93016 CV STRESS TEST SUPVJ ONLY: CPT | Mod: GC

## 2018-10-03 RX ORDER — AMLODIPINE BESYLATE 2.5 MG/1
1 TABLET ORAL
Qty: 30 | Refills: 0 | OUTPATIENT
Start: 2018-10-03 | End: 2018-11-01

## 2018-10-03 RX ORDER — ASPIRIN/CALCIUM CARB/MAGNESIUM 324 MG
1 TABLET ORAL
Qty: 0 | Refills: 0 | COMMUNITY
Start: 2018-10-03

## 2018-10-03 RX ORDER — FAMOTIDINE 10 MG/ML
1 INJECTION INTRAVENOUS
Qty: 0 | Refills: 0 | COMMUNITY
Start: 2018-10-03

## 2018-10-03 RX ADMIN — HEPARIN SODIUM 5000 UNIT(S): 5000 INJECTION INTRAVENOUS; SUBCUTANEOUS at 05:35

## 2018-10-03 RX ADMIN — AMLODIPINE BESYLATE 10 MILLIGRAM(S): 2.5 TABLET ORAL at 05:36

## 2018-10-03 NOTE — DISCHARGE NOTE ADULT - CARE PROVIDER_API CALL
Benoit Markham), Medicine  Dept Director  [address] [place], [place] [address]  Phone: [phone]  Fax: [phone]

## 2018-10-03 NOTE — DISCHARGE NOTE ADULT - PATIENT PORTAL LINK FT
You can access the [facility] Patient Portal, offered by [facility], by registering with the following website: [email or web address]

## 2018-10-03 NOTE — DISCHARGE NOTE ADULT - MEDICATION SUMMARY - MEDICATIONS TO STOP TAKING
I will STOP taking the medications listed below when I get home from the hospital:    hydroCHLOROthiazide-triamterene  --  by mouth    levoFLOXacin 500 mg oral tablet  -- 1 tab(s) by mouth every 24 hours    Tessalon Perles 100 mg oral capsule  -- 1 cap(s) by mouth every 8 hours, As needed, Cough

## 2018-10-03 NOTE — PROGRESS NOTE ADULT - SUBJECTIVE AND OBJECTIVE BOX
SUBJECTIVE / OVERNIGHT EVENTS: pt dneies chest pain, shortness of breath, nausea,v       MEDICATIONS  (STANDING):  amLODIPine   Tablet 10 milliGRAM(s) Oral daily  aspirin enteric coated 81 milliGRAM(s) Oral daily  famotidine    Tablet 20 milliGRAM(s) Oral daily  heparin  Injectable 5000 Unit(s) SubCutaneous every 12 hours  influenza   Vaccine 0.5 milliLiter(s) IntraMuscular once    MEDICATIONS  (PRN):  Vital Signs Last 24 Hrs  T(C): 36.8 (02 Oct 2018 20:08), Max: 36.8 (02 Oct 2018 20:08)  T(F): 98.2 (02 Oct 2018 20:08), Max: 98.2 (02 Oct 2018 20:08)  HR: 48 (02 Oct 2018 20:08) (48 - 60)  BP: 98/50 (02 Oct 2018 20:08) (98/50 - 159/69)  BP(mean): --  RR: 17 (02 Oct 2018 20:08) (16 - 17)  SpO2: 98% (02 Oct 2018 20:08) (98% - 100%)      CAPILLARY BLOOD GLUCOSE        I&O's Summary    01 Oct 2018 07:01  -  02 Oct 2018 07:00  --------------------------------------------------------  IN: 100 mL / OUT: 0 mL / NET: 100 mL    02 Oct 2018 07:01  -  02 Oct 2018 23:53  --------------------------------------------------------  IN: 200 mL / OUT: 0 mL / NET: 200 mL        Constitutional: No fever, fatigue  Skin: No rash.  Eyes: No recent vision problems or eye pain.  ENT: No congestion, ear pain, or sore throat.  Cardiovascular: No chest pain or palpation.  Respiratory: No cough, shortness of breath, congestion, or wheezing.  Gastrointestinal: No abdominal pain, nausea, vomiting, or diarrhea.  Genitourinary: No dysuria.  Musculoskeletal: No joint swelling.  Neurologic: No headache.    PHYSICAL EXAM:  GENERAL: NAD  EYES: EOMI, PERRLA  NECK: Supple, No JVD  CHEST/LUNG: cta malissa   HEART:  S1 , S2 +  ABDOMEN: sof,t bs+  EXTREMITIES:  no edema   NEUROLOGY:alert awake      LABS:                        14.1   4.53  )-----------( 197      ( 02 Oct 2018 07:18 )             43.6     10-02    138  |  101  |  17  ----------------------------<  100<H>  3.9   |  22  |  1.08    Ca    9.3      02 Oct 2018 07:18  Phos  3.1     10-01  Mg     2.3     10-02                RADIOLOGY & ADDITIONAL TESTS:    Imaging Personally Reviewed:    Consultant(s) Notes Reviewed:      Care Discussed with Consultants/Other Providers:
SUBJECTIVE / OVERNIGHT EVENTS: pt dneies chest pain, shortness of breath, nausea,v     MEDICATIONS  (STANDING):  amLODIPine   Tablet 10 milliGRAM(s) Oral daily  aspirin enteric coated 81 milliGRAM(s) Oral daily  famotidine    Tablet 20 milliGRAM(s) Oral daily  heparin  Injectable 5000 Unit(s) SubCutaneous every 12 hours  influenza   Vaccine 0.5 milliLiter(s) IntraMuscular once    MEDICATIONS  (PRN):    Vital Signs Last 24 Hrs  T(C): 36.5 (03 Oct 2018 13:39), Max: 36.7 (03 Oct 2018 05:34)  T(F): 97.7 (03 Oct 2018 13:39), Max: 98.1 (03 Oct 2018 05:34)  HR: 41 (03 Oct 2018 13:39) (41 - 47)  BP: 155/56 (03 Oct 2018 13:39) (155/56 - 184/67)  BP(mean): --  RR: 18 (03 Oct 2018 13:39) (18 - 18)  SpO2: 100% (03 Oct 2018 13:39) (100% - 100%)  Constitutional: No fever, fatigue  Skin: No rash.  Eyes: No recent vision problems or eye pain.  ENT: No congestion, ear pain, or sore throat.  Cardiovascular: No chest pain or palpation.  Respiratory: No cough, shortness of breath, congestion, or wheezing.  Gastrointestinal: No abdominal pain, nausea, vomiting, or diarrhea.  Genitourinary: No dysuria.  Musculoskeletal: No joint swelling.  Neurologic: No headache.    PHYSICAL EXAM:  GENERAL: NAD  EYES: EOMI, PERRLA  NECK: Supple, No JVD  CHEST/LUNG: cta malissa   HEART:  S1 , S2 +  ABDOMEN: sof,t bs+  EXTREMITIES:  no edema   NEUROLOGY:alert awake      LABS:  10-03    139  |  103  |  15  ----------------------------<  80  4.5   |  26  |  1.05    Ca    9.0      03 Oct 2018 05:36  Mg     2.3     10-02      Creatinine Trend: 1.05 <--, 1.08 <--, 0.95 <--, 1.11 <--                        13.6   4.35  )-----------( 196      ( 03 Oct 2018 05:48 )             43.1     Urine Studies:

## 2018-10-03 NOTE — DISCHARGE NOTE ADULT - PLAN OF CARE
medication compliance Low sodium and fat diet, continue anti-hypertensive medications, and follow up with primary care physician for blood pressure check Cardiac enzymes negative, no telemetry events, NST normal no areas of infarction or ischemia, Echocardiogram with normal EF 61%

## 2018-10-03 NOTE — PROGRESS NOTE ADULT - ASSESSMENT
72 y/o F with PMH of HTN presented with the complaint of substernal chest pain and bradycardia. R/o ACS
72 y/o F with PMH of HTN presented with the complaint of substernal chest pain and bradycardia. R/o ACS

## 2018-10-03 NOTE — DISCHARGE NOTE ADULT - CARE PLAN
Principal Discharge DX:	Hypertensive urgency  Goal:	medication compliance  Assessment and plan of treatment:	Low sodium and fat diet, continue anti-hypertensive medications, and follow up with primary care physician for blood pressure check  Secondary Diagnosis:	Atypical chest pain  Assessment and plan of treatment:	Cardiac enzymes negative, no telemetry events, NST normal no areas of infarction or ischemia, Echocardiogram with normal EF 61%

## 2018-10-03 NOTE — DISCHARGE NOTE ADULT - MEDICATION SUMMARY - MEDICATIONS TO TAKE
I will START or STAY ON the medications listed below when I get home from the hospital:    aspirin 81 mg oral delayed release tablet  -- 1 tab(s) by mouth once a day  -- Indication: For Cardio protective     amLODIPine 10 mg oral tablet  -- 1 tab(s) by mouth once a day  -- Indication: For Hypertension    famotidine 20 mg oral tablet  -- 1 tab(s) by mouth once a day  -- Indication: For GERD

## 2018-10-03 NOTE — PROGRESS NOTE ADULT - PROBLEM SELECTOR PLAN 1
< from: Transthoracic Echocardiogram (10.02.18 @ 15:03) >     Mitral annular calcification, otherwise normal mitral  valve. Minimal mitral regurgitation.  2. Normal left ventricular internal dimensions and wall  thicknesses.  3. Endocardium not well visualized; grossly normal left  ventricular systolic function.  4. The right ventricle is not well visualized; grossly  normal right ventricular systolic function.    < end of copied text >
< from: Transthoracic Echocardiogram (10.02.18 @ 15:03) >     Mitral annular calcification, otherwise normal mitral  valve. Minimal mitral regurgitation.  2. Normal left ventricular internal dimensions and wall  thicknesses.  3. Endocardium not well visualized; grossly normal left  ventricular systolic function.  4. The right ventricle is not well visualized; grossly  normal right ventricular systolic function.    is stress test neg poss dc after cards clearance

## 2019-08-10 ENCOUNTER — EMERGENCY (EMERGENCY)
Facility: HOSPITAL | Age: 74
LOS: 1 days | Discharge: HOME | End: 2019-08-10
Attending: EMERGENCY MEDICINE | Admitting: EMERGENCY MEDICINE
Payer: MEDICARE

## 2019-08-10 VITALS
OXYGEN SATURATION: 98 % | SYSTOLIC BLOOD PRESSURE: 190 MMHG | TEMPERATURE: 98 F | HEART RATE: 68 BPM | DIASTOLIC BLOOD PRESSURE: 88 MMHG | RESPIRATION RATE: 18 BRPM

## 2019-08-10 VITALS
DIASTOLIC BLOOD PRESSURE: 91 MMHG | TEMPERATURE: 98 F | OXYGEN SATURATION: 98 % | RESPIRATION RATE: 15 BRPM | SYSTOLIC BLOOD PRESSURE: 193 MMHG | HEART RATE: 65 BPM

## 2019-08-10 DIAGNOSIS — Z90.49 ACQUIRED ABSENCE OF OTHER SPECIFIED PARTS OF DIGESTIVE TRACT: Chronic | ICD-10-CM

## 2019-08-10 DIAGNOSIS — Z90.710 ACQUIRED ABSENCE OF BOTH CERVIX AND UTERUS: Chronic | ICD-10-CM

## 2019-08-10 PROCEDURE — 99283 EMERGENCY DEPT VISIT LOW MDM: CPT

## 2019-08-10 RX ORDER — IPRATROPIUM/ALBUTEROL SULFATE 18-103MCG
3 AEROSOL WITH ADAPTER (GRAM) INHALATION ONCE
Refills: 0 | Status: COMPLETED | OUTPATIENT
Start: 2019-08-10 | End: 2019-08-10

## 2019-08-10 RX ADMIN — Medication 3 MILLILITER(S): at 23:46

## 2019-08-10 RX ADMIN — Medication 100 MILLIGRAM(S): at 23:46

## 2019-08-10 NOTE — ED ADULT TRIAGE NOTE - CHIEF COMPLAINT QUOTE
alert c/o constant cough since monday  denies fever  hx HTN   has been using dayquil and nyquil with no relief

## 2019-08-10 NOTE — ED ADULT NURSE NOTE - NSIMPLEMENTINTERV_GEN_ALL_ED
Implemented All Universal Safety Interventions:  [place] to call system. Call bell, personal items and telephone within reach. Instruct patient to call for assistance. Room bathroom lighting operational. Non-slip footwear when patient is off stretcher. Physically safe environment: no spills, clutter or unnecessary equipment. Stretcher in lowest position, wheels locked, appropriate side rails in place.

## 2019-08-10 NOTE — ED ADULT NURSE NOTE - OBJECTIVE STATEMENT
73y/o female aaox4 and ambulatory c/o cough x1 week. Pt denies coughing up sputum; but states she feels like something is "stuck." Pt states bilateral rib pain d/t coughing so often described as soreness. Pt states feeling SOB after coughing; but at rest does not. Pt denies HA, recent travel, diaphoresis, HA, abdominal pain, N/V/D, palpitations, fevers, chills. Respirations even and unlabored. Vital signs as noted. Pt family at bedside. Call light within reach. Will continue to monitor.

## 2019-08-11 PROCEDURE — 71046 X-RAY EXAM CHEST 2 VIEWS: CPT | Mod: 26

## 2019-08-11 NOTE — ED PROVIDER NOTE - ATTENDING CONTRIBUTION TO CARE
Dr. Donald: I have personally performed a face to face bedside history and physical examination of this patient. I have discussed the history, examination, review of systems, assessment and plan of management with the resident. I have reviewed the electronic medical record and amended it to reflect my history, review of systems, physical exam, assessment and plan.     Attending Exam - Dr. Donald: GEN: well appearing, NAD  HEENT: +PERRL, EOMI  RESP: CTAB, no signs of respiratory distress CV: s1s2 RRR ABD: soft/non tender/non distended  MSK: no deformities / swelling, normal range of motion, spine grossly normal NEURO: alert, non focal exam SKIN: normal color / temperature / condition.

## 2019-08-11 NOTE — ED PROVIDER NOTE - NSFOLLOWUPINSTRUCTIONS_ED_ALL_ED_FT
Follow-up with your Primary Care Physician within 2 - 4 days.  Please return to the Emergency Department immediately for any new, worsening or concerning symptoms.

## 2019-08-11 NOTE — ED PROVIDER NOTE - CLINICAL SUMMARY MEDICAL DECISION MAKING FREE TEXT BOX
74 year-old female with history of HTN presents to the Emergency Department for cough x 5 days; unlikely PNA - CXR to eval.  Unlikely ACS, PE, dissection given presentation.

## 2019-08-11 NOTE — ED PROVIDER NOTE - PROGRESS NOTE DETAILS
Chantel JUNG: Patient did not take her BP medications today.  No chest pain, shortness of breath, HA.  Advised to take her medications when she gets home.

## 2019-08-11 NOTE — ED PROVIDER NOTE - PHYSICAL EXAMINATION
*Gen: NAD, AAO*3  *HEENT: NC/AT, MMM, airway patent, trachea midline  *CV: RRR, S1/S2 present, no murmurs  *Resp: no respiratory distress, LCTAB, no wheezing/rales  *Abd: non-distended, soft N/Tx4, no guarding or rigidity  *Neuro: no focal neuro deficits, moving all limbs appropriately  *Extremities: no gross deformity  *Skin: no rashes, no wounds   ~ Lizandro Golden M.D.

## 2019-08-11 NOTE — ED PROVIDER NOTE - OBJECTIVE STATEMENT
74 year-old female with history of HTN presents to the Emergency Department for cough x 5 days; no sputum production.  Patient was concerned she may have a PNA and came to the ED to eval.  No fevers, chills, nausea, vomiting, shortness of breath, abdominal pain.  Reports mild b/l lower rib pain with coughing.  No chest pain @ baseline.  Patient did not take any medications at home.

## 2020-01-04 NOTE — ED ADULT NURSE NOTE - PRO INTERPRETER NEED 2
Pt tolerated low residue diet without increased pain/nausea. Pt requesting discharge tonight. Zosyn to finish @6pm. Dr. Preston Scott paged to notify. Will continue to monitor. English

## 2022-06-23 NOTE — PATIENT PROFILE ADULT. - TEACHING/LEARNING FACTORS IMPACT ABILITY TO LEARN OTHER
Quality 110: Preventive Care And Screening: Influenza Immunization: Influenza Immunization not Administered due to Patient Allergy. Detail Level: Generalized Quality 431: Preventive Care And Screening: Unhealthy Alcohol Use - Screening: Patient not identified as an unhealthy alcohol user when screened for unhealthy alcohol use using a systematic screening method Quality 111:Pneumonia Vaccination Status For Older Adults: Pneumococcal vaccine administered on or after patient’s 60th birthday and before the end of the measurement period Additional Notes: Patient has not received COVID vaccines none

## 2023-04-17 NOTE — ED ADULT NURSE NOTE - NS ED PATIENT SAFETY CONCERN
Visit Discharge/Physician Orders:   - Elevate legs to reduce swelling.  - Sponge bath while the wraps are on or use a cast cover if showering.  - Right ankle wound debrided today. - Apply compression stocking to left leg daily in the morning and remove at bedtime. Wound Location: Right leg, Right ankle     Dressing orders:      1) Gather wound care supplies and arrange on clean table. 2) Wash your hands with soap and water or use alcohol based hand  for 20 seconds (sing \"Happy Birthday\" twice). 3) Cleanse wounds with normal saline or wound cleanser and gauze. Pat dry with clean gauze. 4) Right ankle, leg wounds- Applied triad paste around wounds. Applied alginate to wound beds. Wrapped with unna boot, ABD over wound area, roll gauze, and coban from base of toes to 1-2 inches below bend of knee. Leave dressing intact until next appt. *If unna boot becomes too tight- raise/elevate legs above the level of the heart for 15-20 minutes if swelling does not go down then carefully cut off unna boot and call clinic or go to local ER or family physician. If unna boot becomes wet or starts to roll down then carefully remove unna boot and call clinic. Keep all dressings clean & dry. Follow up visits:   Thursday 4/20/23 at 1pm   Monday 4/24/23 at 1pm     Supplies:     Duration of dressings: n/a        We have sent your supply order to the following company:  n/a  If you don't receive the items you were expecting or don't know what the items are that you received, call the company where the order was sent. If you are unable to obtain wound supplies, continue to use the supplies you have available until you are able to reach us. It is most important to keep the wound covered at all times. It is YOUR responsibility to make sure that supplies are re-ordered before you run out. Re-order telephone numbers are included in each package. Keep next scheduled appointment.  Please give 24 hour No

## 2024-12-03 NOTE — H&P ADULT - PROBLEM SELECTOR PROBLEM 4
Encourage pt to ask for assistance   
Patient at baseline no behaviors reported by staff  
Stable   
Preventive measure